# Patient Record
Sex: FEMALE | Race: BLACK OR AFRICAN AMERICAN | Employment: UNEMPLOYED | URBAN - METROPOLITAN AREA
[De-identification: names, ages, dates, MRNs, and addresses within clinical notes are randomized per-mention and may not be internally consistent; named-entity substitution may affect disease eponyms.]

---

## 2023-12-04 LAB
ABO, EXTERNAL RESULT: NORMAL
C. TRACHOMATIS, EXTERNAL RESULT: NEGATIVE
HEP B, EXTERNAL RESULT: NEGATIVE
HIV, EXTERNAL RESULT: NEGATIVE
N. GONORRHOEAE, EXTERNAL RESULT: NEGATIVE
RH FACTOR, EXTERNAL RESULT: POSITIVE
RPR, EXTERNAL RESULT: NONREACTIVE
RUBELLA TITER, EXTERNAL RESULT: NORMAL

## 2024-04-15 LAB — HEPATITIS C ANTIBODY, EXTERNAL RESULT: NEGATIVE

## 2024-06-24 LAB — GBS, EXTERNAL RESULT: POSITIVE

## 2024-07-08 ENCOUNTER — ANESTHESIA (OUTPATIENT)
Facility: HOSPITAL | Age: 24
DRG: 560 | End: 2024-07-08
Payer: MEDICAID

## 2024-07-08 ENCOUNTER — ANESTHESIA EVENT (OUTPATIENT)
Facility: HOSPITAL | Age: 24
DRG: 560 | End: 2024-07-08
Payer: MEDICAID

## 2024-07-08 ENCOUNTER — HOSPITAL ENCOUNTER (INPATIENT)
Facility: HOSPITAL | Age: 24
LOS: 2 days | Discharge: HOME | DRG: 560 | End: 2024-07-10
Attending: STUDENT IN AN ORGANIZED HEALTH CARE EDUCATION/TRAINING PROGRAM | Admitting: STUDENT IN AN ORGANIZED HEALTH CARE EDUCATION/TRAINING PROGRAM
Payer: MEDICAID

## 2024-07-08 PROBLEM — Z3A.38 38 WEEKS GESTATION OF PREGNANCY: Status: ACTIVE | Noted: 2024-07-08

## 2024-07-08 LAB
ABO + RH BLD: NORMAL
ALBUMIN SERPL-MCNC: 2.6 G/DL (ref 3.5–5)
ALBUMIN/GLOB SERPL: 0.7 (ref 1.1–2.2)
ALP SERPL-CCNC: 105 U/L (ref 45–117)
ALT SERPL-CCNC: 14 U/L (ref 12–78)
ANION GAP SERPL CALC-SCNC: 8 MMOL/L (ref 5–15)
AST SERPL-CCNC: 14 U/L (ref 15–37)
BASOPHILS # BLD: 0 K/UL (ref 0–0.1)
BASOPHILS NFR BLD: 0 % (ref 0–1)
BILIRUB SERPL-MCNC: 0.2 MG/DL (ref 0.2–1)
BLOOD GROUP ANTIBODIES SERPL: NORMAL
BUN SERPL-MCNC: 7 MG/DL (ref 6–20)
BUN/CREAT SERPL: 10 (ref 12–20)
CALCIUM SERPL-MCNC: 8.1 MG/DL (ref 8.5–10.1)
CHLORIDE SERPL-SCNC: 111 MMOL/L (ref 97–108)
CO2 SERPL-SCNC: 18 MMOL/L (ref 21–32)
CREAT SERPL-MCNC: 0.73 MG/DL (ref 0.55–1.02)
CREAT UR-MCNC: 352 MG/DL
DIFFERENTIAL METHOD BLD: ABNORMAL
EOSINOPHIL # BLD: 0.1 K/UL (ref 0–0.4)
EOSINOPHIL NFR BLD: 2 % (ref 0–7)
ERYTHROCYTE [DISTWIDTH] IN BLOOD BY AUTOMATED COUNT: 14.9 % (ref 11.5–14.5)
GLOBULIN SER CALC-MCNC: 3.6 G/DL (ref 2–4)
GLUCOSE SERPL-MCNC: 106 MG/DL (ref 65–100)
HCT VFR BLD AUTO: 32.7 % (ref 35–47)
HGB BLD-MCNC: 11 G/DL (ref 11.5–16)
IMM GRANULOCYTES # BLD AUTO: 0 K/UL (ref 0–0.04)
IMM GRANULOCYTES NFR BLD AUTO: 0 % (ref 0–0.5)
LYMPHOCYTES # BLD: 1.8 K/UL (ref 0.8–3.5)
LYMPHOCYTES NFR BLD: 26 % (ref 12–49)
MCH RBC QN AUTO: 28.5 PG (ref 26–34)
MCHC RBC AUTO-ENTMCNC: 33.6 G/DL (ref 30–36.5)
MCV RBC AUTO: 84.7 FL (ref 80–99)
MONOCYTES # BLD: 0.5 K/UL (ref 0–1)
MONOCYTES NFR BLD: 8 % (ref 5–13)
NEUTS SEG # BLD: 4.5 K/UL (ref 1.8–8)
NEUTS SEG NFR BLD: 64 % (ref 32–75)
NRBC # BLD: 0 K/UL (ref 0–0.01)
NRBC BLD-RTO: 0 PER 100 WBC
PLATELET # BLD AUTO: 212 K/UL (ref 150–400)
PMV BLD AUTO: 11.9 FL (ref 8.9–12.9)
POTASSIUM SERPL-SCNC: 3.9 MMOL/L (ref 3.5–5.1)
PROT SERPL-MCNC: 6.2 G/DL (ref 6.4–8.2)
PROT UR-MCNC: 41 MG/DL (ref 0–11.9)
PROT/CREAT UR-RTO: 0.1
RBC # BLD AUTO: 3.86 M/UL (ref 3.8–5.2)
SODIUM SERPL-SCNC: 137 MMOL/L (ref 136–145)
SPECIMEN EXP DATE BLD: NORMAL
WBC # BLD AUTO: 7 K/UL (ref 3.6–11)

## 2024-07-08 PROCEDURE — 2500000003 HC RX 250 WO HCPCS: Performed by: NURSE ANESTHETIST, CERTIFIED REGISTERED

## 2024-07-08 PROCEDURE — G0378 HOSPITAL OBSERVATION PER HR: HCPCS

## 2024-07-08 PROCEDURE — 51701 INSERT BLADDER CATHETER: CPT

## 2024-07-08 PROCEDURE — 86780 TREPONEMA PALLIDUM: CPT

## 2024-07-08 PROCEDURE — 7210000100 HC LABOR FEE PER 1 HR

## 2024-07-08 PROCEDURE — 0UQMXZZ REPAIR VULVA, EXTERNAL APPROACH: ICD-10-PCS | Performed by: STUDENT IN AN ORGANIZED HEALTH CARE EDUCATION/TRAINING PROGRAM

## 2024-07-08 PROCEDURE — 86901 BLOOD TYPING SEROLOGIC RH(D): CPT

## 2024-07-08 PROCEDURE — G0379 DIRECT REFER HOSPITAL OBSERV: HCPCS

## 2024-07-08 PROCEDURE — 00HU33Z INSERTION OF INFUSION DEVICE INTO SPINAL CANAL, PERCUTANEOUS APPROACH: ICD-10-PCS | Performed by: ANESTHESIOLOGY

## 2024-07-08 PROCEDURE — 3700000156 HC EPIDURAL ANESTHESIA: Performed by: NURSE ANESTHETIST, CERTIFIED REGISTERED

## 2024-07-08 PROCEDURE — 2580000003 HC RX 258: Performed by: STUDENT IN AN ORGANIZED HEALTH CARE EDUCATION/TRAINING PROGRAM

## 2024-07-08 PROCEDURE — 0HQ9XZZ REPAIR PERINEUM SKIN, EXTERNAL APPROACH: ICD-10-PCS | Performed by: STUDENT IN AN ORGANIZED HEALTH CARE EDUCATION/TRAINING PROGRAM

## 2024-07-08 PROCEDURE — 7220000101 HC DELIVERY VAGINAL/SINGLE

## 2024-07-08 PROCEDURE — 2500000003 HC RX 250 WO HCPCS: Performed by: STUDENT IN AN ORGANIZED HEALTH CARE EDUCATION/TRAINING PROGRAM

## 2024-07-08 PROCEDURE — 86850 RBC ANTIBODY SCREEN: CPT

## 2024-07-08 PROCEDURE — 85025 COMPLETE CBC W/AUTO DIFF WBC: CPT

## 2024-07-08 PROCEDURE — 80053 COMPREHEN METABOLIC PANEL: CPT

## 2024-07-08 PROCEDURE — 86900 BLOOD TYPING SEROLOGIC ABO: CPT

## 2024-07-08 PROCEDURE — 82570 ASSAY OF URINE CREATININE: CPT

## 2024-07-08 PROCEDURE — 3700000025 EPIDURAL BLOCK: Performed by: ANESTHESIOLOGY

## 2024-07-08 PROCEDURE — 1120000000 HC RM PRIVATE OB

## 2024-07-08 PROCEDURE — 6360000002 HC RX W HCPCS: Performed by: NURSE ANESTHETIST, CERTIFIED REGISTERED

## 2024-07-08 PROCEDURE — 6370000000 HC RX 637 (ALT 250 FOR IP): Performed by: STUDENT IN AN ORGANIZED HEALTH CARE EDUCATION/TRAINING PROGRAM

## 2024-07-08 PROCEDURE — 84156 ASSAY OF PROTEIN URINE: CPT

## 2024-07-08 PROCEDURE — 36415 COLL VENOUS BLD VENIPUNCTURE: CPT

## 2024-07-08 PROCEDURE — 6360000002 HC RX W HCPCS: Performed by: STUDENT IN AN ORGANIZED HEALTH CARE EDUCATION/TRAINING PROGRAM

## 2024-07-08 RX ORDER — MAGNESIUM CARB/ALUMINUM HYDROX 105-160MG
30 TABLET,CHEWABLE ORAL DAILY PRN
Status: DISCONTINUED | OUTPATIENT
Start: 2024-07-08 | End: 2024-07-10 | Stop reason: HOSPADM

## 2024-07-08 RX ORDER — EPHEDRINE SULFATE/0.9% NACL/PF 25 MG/5 ML
10 SYRINGE (ML) INTRAVENOUS ONCE
Status: DISCONTINUED | OUTPATIENT
Start: 2024-07-08 | End: 2024-07-10 | Stop reason: HOSPADM

## 2024-07-08 RX ORDER — SODIUM CHLORIDE 0.9 % (FLUSH) 0.9 %
5-40 SYRINGE (ML) INJECTION EVERY 12 HOURS SCHEDULED
Status: DISCONTINUED | OUTPATIENT
Start: 2024-07-08 | End: 2024-07-10 | Stop reason: HOSPADM

## 2024-07-08 RX ORDER — PROCHLORPERAZINE EDISYLATE 5 MG/ML
10 INJECTION INTRAMUSCULAR; INTRAVENOUS EVERY 6 HOURS PRN
Status: DISCONTINUED | OUTPATIENT
Start: 2024-07-08 | End: 2024-07-10 | Stop reason: HOSPADM

## 2024-07-08 RX ORDER — SODIUM CHLORIDE, SODIUM LACTATE, POTASSIUM CHLORIDE, AND CALCIUM CHLORIDE .6; .31; .03; .02 G/100ML; G/100ML; G/100ML; G/100ML
1000 INJECTION, SOLUTION INTRAVENOUS PRN
Status: DISCONTINUED | OUTPATIENT
Start: 2024-07-08 | End: 2024-07-10 | Stop reason: HOSPADM

## 2024-07-08 RX ORDER — SODIUM CHLORIDE 0.9 % (FLUSH) 0.9 %
5-40 SYRINGE (ML) INJECTION PRN
Status: DISCONTINUED | OUTPATIENT
Start: 2024-07-08 | End: 2024-07-10 | Stop reason: HOSPADM

## 2024-07-08 RX ORDER — FENTANYL/BUPIVACAINE/NS/PF 2-1250MCG
10 PLASTIC BAG, INJECTION (ML) INJECTION CONTINUOUS
Status: DISCONTINUED | OUTPATIENT
Start: 2024-07-08 | End: 2024-07-10 | Stop reason: HOSPADM

## 2024-07-08 RX ORDER — NALOXONE HYDROCHLORIDE 0.4 MG/ML
INJECTION, SOLUTION INTRAMUSCULAR; INTRAVENOUS; SUBCUTANEOUS PRN
Status: DISCONTINUED | OUTPATIENT
Start: 2024-07-08 | End: 2024-07-10 | Stop reason: HOSPADM

## 2024-07-08 RX ORDER — ONDANSETRON 2 MG/ML
4 INJECTION INTRAMUSCULAR; INTRAVENOUS EVERY 6 HOURS PRN
Status: DISCONTINUED | OUTPATIENT
Start: 2024-07-08 | End: 2024-07-10 | Stop reason: HOSPADM

## 2024-07-08 RX ORDER — ONDANSETRON 4 MG/1
4 TABLET, ORALLY DISINTEGRATING ORAL EVERY 6 HOURS PRN
Status: DISCONTINUED | OUTPATIENT
Start: 2024-07-08 | End: 2024-07-10 | Stop reason: HOSPADM

## 2024-07-08 RX ORDER — LIDOCAINE HYDROCHLORIDE 20 MG/ML
INJECTION, SOLUTION EPIDURAL; INFILTRATION; INTRACAUDAL; PERINEURAL PRN
Status: DISCONTINUED | OUTPATIENT
Start: 2024-07-08 | End: 2024-07-08 | Stop reason: SDUPTHER

## 2024-07-08 RX ORDER — SODIUM CHLORIDE, SODIUM LACTATE, POTASSIUM CHLORIDE, AND CALCIUM CHLORIDE .6; .31; .03; .02 G/100ML; G/100ML; G/100ML; G/100ML
500 INJECTION, SOLUTION INTRAVENOUS PRN
Status: DISCONTINUED | OUTPATIENT
Start: 2024-07-08 | End: 2024-07-10 | Stop reason: HOSPADM

## 2024-07-08 RX ORDER — DOCUSATE SODIUM 100 MG/1
100 CAPSULE, LIQUID FILLED ORAL 2 TIMES DAILY
Status: DISCONTINUED | OUTPATIENT
Start: 2024-07-08 | End: 2024-07-09 | Stop reason: SDUPTHER

## 2024-07-08 RX ORDER — CARBOPROST TROMETHAMINE 250 UG/ML
250 INJECTION, SOLUTION INTRAMUSCULAR PRN
Status: DISCONTINUED | OUTPATIENT
Start: 2024-07-08 | End: 2024-07-10 | Stop reason: HOSPADM

## 2024-07-08 RX ORDER — SODIUM CHLORIDE, SODIUM LACTATE, POTASSIUM CHLORIDE, CALCIUM CHLORIDE 600; 310; 30; 20 MG/100ML; MG/100ML; MG/100ML; MG/100ML
INJECTION, SOLUTION INTRAVENOUS CONTINUOUS
Status: DISCONTINUED | OUTPATIENT
Start: 2024-07-08 | End: 2024-07-10 | Stop reason: HOSPADM

## 2024-07-08 RX ORDER — LIDOCAINE HYDROCHLORIDE 10 MG/ML
30 INJECTION, SOLUTION EPIDURAL; INFILTRATION; INTRACAUDAL; PERINEURAL PRN
Status: DISCONTINUED | OUTPATIENT
Start: 2024-07-08 | End: 2024-07-10 | Stop reason: HOSPADM

## 2024-07-08 RX ORDER — SODIUM CHLORIDE 9 MG/ML
25 INJECTION, SOLUTION INTRAVENOUS PRN
Status: DISCONTINUED | OUTPATIENT
Start: 2024-07-08 | End: 2024-07-10 | Stop reason: HOSPADM

## 2024-07-08 RX ORDER — FENTANYL CITRATE 50 UG/ML
25 INJECTION, SOLUTION INTRAMUSCULAR; INTRAVENOUS
Status: DISCONTINUED | OUTPATIENT
Start: 2024-07-08 | End: 2024-07-10 | Stop reason: HOSPADM

## 2024-07-08 RX ORDER — PNV NO.95/FERROUS FUM/FOLIC AC 28MG-0.8MG
TABLET ORAL
COMMUNITY

## 2024-07-08 RX ORDER — TERBUTALINE SULFATE 1 MG/ML
0.25 INJECTION, SOLUTION SUBCUTANEOUS
Status: ACTIVE | OUTPATIENT
Start: 2024-07-08 | End: 2024-07-09

## 2024-07-08 RX ORDER — METHYLERGONOVINE MALEATE 0.2 MG/ML
200 INJECTION INTRAVENOUS PRN
Status: DISCONTINUED | OUTPATIENT
Start: 2024-07-08 | End: 2024-07-10 | Stop reason: HOSPADM

## 2024-07-08 RX ORDER — MISOPROSTOL 200 UG/1
400 TABLET ORAL PRN
Status: DISCONTINUED | OUTPATIENT
Start: 2024-07-08 | End: 2024-07-10 | Stop reason: HOSPADM

## 2024-07-08 RX ORDER — TRANEXAMIC ACID 10 MG/ML
1000 INJECTION, SOLUTION INTRAVENOUS
Status: COMPLETED | OUTPATIENT
Start: 2024-07-08 | End: 2024-07-08

## 2024-07-08 RX ORDER — BUPIVACAINE HYDROCHLORIDE 2.5 MG/ML
INJECTION, SOLUTION EPIDURAL; INFILTRATION; INTRACAUDAL PRN
Status: DISCONTINUED | OUTPATIENT
Start: 2024-07-08 | End: 2024-07-08 | Stop reason: SDUPTHER

## 2024-07-08 RX ADMIN — LIDOCAINE HYDROCHLORIDE 3 ML: 10; .005 INJECTION, SOLUTION EPIDURAL; INFILTRATION; INTRACAUDAL; PERINEURAL at 17:49

## 2024-07-08 RX ADMIN — SODIUM CHLORIDE, POTASSIUM CHLORIDE, SODIUM LACTATE AND CALCIUM CHLORIDE: 600; 310; 30; 20 INJECTION, SOLUTION INTRAVENOUS at 18:14

## 2024-07-08 RX ADMIN — OXYTOCIN 2 MILLI-UNITS/MIN: 10 INJECTION, SOLUTION INTRAMUSCULAR; INTRAVENOUS at 18:16

## 2024-07-08 RX ADMIN — BUPIVACAINE HYDROCHLORIDE 6 MG: 2.5 INJECTION, SOLUTION EPIDURAL; INFILTRATION; INTRACAUDAL; PERINEURAL at 17:55

## 2024-07-08 RX ADMIN — METHYLERGONOVINE MALEATE 200 MCG: 0.2 INJECTION, SOLUTION INTRAMUSCULAR; INTRAVENOUS at 23:56

## 2024-07-08 RX ADMIN — HYDROMORPHONE HYDROCHLORIDE 0.5 MG: 1 INJECTION, SOLUTION INTRAMUSCULAR; INTRAVENOUS; SUBCUTANEOUS at 15:30

## 2024-07-08 RX ADMIN — Medication 10 ML/HR: at 18:04

## 2024-07-08 RX ADMIN — LIDOCAINE HYDROCHLORIDE 3 ML: 20 INJECTION, SOLUTION EPIDURAL; INFILTRATION; INTRACAUDAL at 17:39

## 2024-07-08 RX ADMIN — ONDANSETRON 4 MG: 2 INJECTION INTRAMUSCULAR; INTRAVENOUS at 15:27

## 2024-07-08 RX ADMIN — TRANEXAMIC ACID 1000 MG: 10 INJECTION, SOLUTION INTRAVENOUS at 22:47

## 2024-07-08 RX ADMIN — SODIUM CHLORIDE, POTASSIUM CHLORIDE, SODIUM LACTATE AND CALCIUM CHLORIDE 1000 ML: 600; 310; 30; 20 INJECTION, SOLUTION INTRAVENOUS at 15:28

## 2024-07-08 RX ADMIN — SODIUM CHLORIDE 5 MILLION UNITS: 900 INJECTION INTRAVENOUS at 12:54

## 2024-07-08 RX ADMIN — SODIUM CHLORIDE 2.5 MILLION UNITS: 9 INJECTION, SOLUTION INTRAVENOUS at 16:32

## 2024-07-08 RX ADMIN — OXYTOCIN 87.3 MILLI-UNITS/MIN: 10 INJECTION, SOLUTION INTRAMUSCULAR; INTRAVENOUS at 23:55

## 2024-07-08 RX ADMIN — Medication 25 MCG: at 13:21

## 2024-07-08 ASSESSMENT — PAIN SCALES - GENERAL: PAINLEVEL_OUTOF10: 5

## 2024-07-08 ASSESSMENT — PAIN DESCRIPTION - ORIENTATION: ORIENTATION: ANTERIOR

## 2024-07-08 ASSESSMENT — PAIN DESCRIPTION - DESCRIPTORS: DESCRIPTORS: CRAMPING

## 2024-07-08 ASSESSMENT — PAIN - FUNCTIONAL ASSESSMENT: PAIN_FUNCTIONAL_ASSESSMENT: PREVENTS OR INTERFERES SOME ACTIVE ACTIVITIES AND ADLS

## 2024-07-08 ASSESSMENT — PAIN DESCRIPTION - LOCATION: LOCATION: ABDOMEN

## 2024-07-08 NOTE — PROGRESS NOTES
1135 Pt arrives to unit for labor. Pt's water broke around 0900. Pt stated fluid was moderate and clear, no fluid noted. Pt denies ctx, bleeding, or pain. This RN explained importance of ambulation and position changes. Pt's questions answered.    1214 VORB for misoprostol and pcn by MD Yin.    1504 This RN requested pain medication orders.    1550 Pt requests epidural. PENNIE Dawkins MD notified.    1734 Chitra CRNA at bedside for epidural placement    1738 Epidural Time Out     1748 Epidural Test dose    1754 Pt assisted back to supine position. Epidural bolus given.

## 2024-07-08 NOTE — PROGRESS NOTES
1900- Report received from EARL Erickson RN   2040-Patient actively pushing.  RN remains in continuous attendance at the bedside.  Assessment & evaluation of fetal heart rate ongoing via continuous EFM.   2100- Pt laboring down   2137- Patient actively pushing.  RN remains in continuous attendance at the bedside.  Assessment & evaluation of fetal heart rate ongoing via continuous EFM.   2159- RN remained at bedside throughout pushing.  EFM continuously assessed.  Vaginal delivery of viable infant.   0000- CNM at bedside to evaluate pt bleeding

## 2024-07-08 NOTE — H&P
History & Physical    Name: Cintia Jenkins MRN: [id number]  SSN: [id number]    YOB: 2000  Age: 24 y.o.  Sex: female        Subjective:     Estimated Date of Delivery: 24    Ms. Jenkins is a  at 38w3d presenting to L&D for PROM. Denies ctx, VB. +GFM. Presented to clinic this morning with complaints of LOF starting around 9am. She has had continuous leaking since that time. Ruled in for rupture x 3 in clinic. Has not had any contractions since that time. She was 2cm at office visit on     Pregnancy complicated by:  GBS positive  H/o depression, previously on Zoloft  BMI 39: EFW at 34wks, 46%tile    OB History          2    Para   0    Term                AB        Living             SAB        IAB        Ectopic        Molar        Multiple        Live Births                  History reviewed. No pertinent past medical history.  No past surgical history on file.  Social History     Occupational History    Not on file   Tobacco Use    Smoking status: Never    Smokeless tobacco: Never   Substance and Sexual Activity    Alcohol use: Not Currently    Drug use: Never    Sexual activity: Not on file     No family history on file.    No Known Allergies  Prior to Admission medications    Medication Sig Start Date End Date Taking? Authorizing Provider   Prenatal Vit-Fe Fumarate-FA (PRENATAL VITAMIN) 27-0.8 MG TABS Take by mouth   Yes Provider, Joyce, MD        Review of Systems: Pertinent items are noted in HPI.    Objective:     Vitals:  Vitals:    24 1158 24 1221 24 1222   BP: 133/62     Pulse: 83  83   Resp: 16     Temp: 98.1 °F (36.7 °C)     TempSrc: Oral     SpO2: 100%     Weight:  (!) 139.7 kg (308 lb)    Height:  1.753 m (5' 9\")         Physical Exam:  Gen: NAD      Fetal Heart Rate: Reactive  Baseline: 115 per minute  Variability: moderate  Accelerations: yes  Decelerations: none  Uterine contractions: irregular, every 6-10 minutes    Prenatal Labs:

## 2024-07-08 NOTE — ANESTHESIA PROCEDURE NOTES
Epidural Block    Patient location during procedure: OB  Start time: 7/8/2024 5:34 PM  End time: 7/8/2024 5:56 PM  Reason for block: labor epidural  Staffing  Performed: resident/CRNA   Anesthesiologist: Enzo Dawkins MD  Resident/CRNA: Khloe Friedman APRN - CRNA  Performed by: Khloe Friedman APRN - CRNA  Authorized by: Enzo Dawkins MD    Epidural  Patient position: sitting  Prep: ChloraPrep  Patient monitoring: continuous pulse ox and frequent blood pressure checks  Approach: midline  Location: L4-5  Injection technique: CAROLYN saline  Provider prep: mask  Needle  Needle type: Tuohy   Needle gauge: 17 G  Needle length: 3.5 in  Needle insertion depth: 9 cm  Catheter type: end hole  Catheter size: 19 G  Catheter at skin depth: 16 cm  Test dose: negativeCatheter Secured: tegaderm and tape  Assessment  Hemodynamics: stable  Attempts: 1  Outcomes: patient tolerated procedure well  Additional Notes  Pt c/o Pain 8/10 with contractions, epidural placed as noted without difficulty.  Preanesthetic Checklist  Completed: patient identified, IV checked, site marked, risks and benefits discussed, surgical/procedural consents, equipment checked, pre-op evaluation, timeout performed, anesthesia consent given, oxygen available, monitors applied/VS acknowledged, fire risk safety assessment completed and verbalized and blood product R/B/A discussed and consented

## 2024-07-08 NOTE — ANESTHESIA PRE PROCEDURE
Department of Anesthesiology  Preprocedure Note       Name:  Cintia Jenkins   Age:  24 y.o.  :  2000                                          MRN:  [id number]         Date:  2024      Surgeon: * No surgeons listed *    Procedure: * No procedures listed *    Medications prior to admission:   Prior to Admission medications    Medication Sig Start Date End Date Taking? Authorizing Provider   Prenatal Vit-Fe Fumarate-FA (PRENATAL VITAMIN) 27-0.8 MG TABS Take by mouth   Yes Provider, MD Joyce       Current medications:    Current Facility-Administered Medications   Medication Dose Route Frequency Provider Last Rate Last Admin    penicillin G potassium 2.5 million units in 0.9% sodium chloride 100 mL IVPB  2.5 Million Units IntraVENous Q4H Elida Esqueda  mL/hr at 24 1632 2.5 Million Units at 24 1632    lactated ringers IV soln infusion   IntraVENous Continuous Elida Esqueda MD        lactated ringers IV soln infusion   IntraVENous Continuous Elida Esqueda MD        lactated ringers bolus 500 mL  500 mL IntraVENous PRN Elida Esqueda MD        Or    lactated ringers bolus 1,000 mL  1,000 mL IntraVENous PRN Elida Esqueda .9 mL/hr at 24 1528 1,000 mL at 24 1528    sodium chloride flush 0.9 % injection 5-40 mL  5-40 mL IntraVENous 2 times per day Elida Esqueda MD        sodium chloride flush 0.9 % injection 5-40 mL  5-40 mL IntraVENous PRN Elida Esqueda MD        0.9 % sodium chloride infusion  25 mL IntraVENous PRN Elida Esqueda MD        miSOPROStol (CYTOTEC) pre-split tablet TABS 25 mcg  25 mcg Vaginal Q2H Elida Esqueda MD        methylergonovine (METHERGINE) injection 200 mcg  200 mcg IntraMUSCular PRN Elida Esqueda MD        carboprost (HEMABATE) injection 250 mcg  250 mcg IntraMUSCular PRElida Kasper MD        miSOPROStol (CYTOTEC) tablet 400 mcg  400 mcg Buccal PRN Elida Esqueda MD        tranexamic acid-NaCl

## 2024-07-09 LAB — T PALLIDUM AB SER QL IA: NON REACTIVE

## 2024-07-09 PROCEDURE — 1120000000 HC RM PRIVATE OB

## 2024-07-09 PROCEDURE — 94761 N-INVAS EAR/PLS OXIMETRY MLT: CPT

## 2024-07-09 PROCEDURE — 2580000003 HC RX 258: Performed by: STUDENT IN AN ORGANIZED HEALTH CARE EDUCATION/TRAINING PROGRAM

## 2024-07-09 PROCEDURE — 2700000000 HC OXYGEN THERAPY PER DAY

## 2024-07-09 PROCEDURE — 6360000002 HC RX W HCPCS: Performed by: STUDENT IN AN ORGANIZED HEALTH CARE EDUCATION/TRAINING PROGRAM

## 2024-07-09 PROCEDURE — 6370000000 HC RX 637 (ALT 250 FOR IP)

## 2024-07-09 PROCEDURE — 51701 INSERT BLADDER CATHETER: CPT

## 2024-07-09 RX ORDER — ACETAMINOPHEN 500 MG
1000 TABLET ORAL EVERY 8 HOURS SCHEDULED
Status: DISCONTINUED | OUTPATIENT
Start: 2024-07-09 | End: 2024-07-10 | Stop reason: HOSPADM

## 2024-07-09 RX ORDER — SODIUM CHLORIDE 0.9 % (FLUSH) 0.9 %
5-40 SYRINGE (ML) INJECTION PRN
Status: DISCONTINUED | OUTPATIENT
Start: 2024-07-09 | End: 2024-07-10 | Stop reason: HOSPADM

## 2024-07-09 RX ORDER — SODIUM CHLORIDE 0.9 % (FLUSH) 0.9 %
5-40 SYRINGE (ML) INJECTION EVERY 12 HOURS SCHEDULED
Status: DISCONTINUED | OUTPATIENT
Start: 2024-07-09 | End: 2024-07-10 | Stop reason: HOSPADM

## 2024-07-09 RX ORDER — ONDANSETRON 4 MG/1
4 TABLET, ORALLY DISINTEGRATING ORAL EVERY 6 HOURS PRN
Status: DISCONTINUED | OUTPATIENT
Start: 2024-07-09 | End: 2024-07-10 | Stop reason: HOSPADM

## 2024-07-09 RX ORDER — ONDANSETRON 2 MG/ML
4 INJECTION INTRAMUSCULAR; INTRAVENOUS EVERY 6 HOURS PRN
Status: DISCONTINUED | OUTPATIENT
Start: 2024-07-09 | End: 2024-07-10 | Stop reason: HOSPADM

## 2024-07-09 RX ORDER — DOCUSATE SODIUM 100 MG/1
100 CAPSULE, LIQUID FILLED ORAL 2 TIMES DAILY
Status: DISCONTINUED | OUTPATIENT
Start: 2024-07-09 | End: 2024-07-10 | Stop reason: HOSPADM

## 2024-07-09 RX ORDER — LANOLIN/MINERAL OIL
LOTION (ML) TOPICAL PRN
Status: DISCONTINUED | OUTPATIENT
Start: 2024-07-09 | End: 2024-07-10 | Stop reason: HOSPADM

## 2024-07-09 RX ORDER — SODIUM CHLORIDE 9 MG/ML
INJECTION, SOLUTION INTRAVENOUS PRN
Status: DISCONTINUED | OUTPATIENT
Start: 2024-07-09 | End: 2024-07-10 | Stop reason: HOSPADM

## 2024-07-09 RX ORDER — IBUPROFEN 800 MG/1
800 TABLET ORAL EVERY 8 HOURS SCHEDULED
Status: DISCONTINUED | OUTPATIENT
Start: 2024-07-09 | End: 2024-07-10 | Stop reason: HOSPADM

## 2024-07-09 RX ADMIN — OXYTOCIN 87.3 MILLI-UNITS/MIN: 10 INJECTION, SOLUTION INTRAMUSCULAR; INTRAVENOUS at 01:26

## 2024-07-09 RX ADMIN — IBUPROFEN 800 MG: 800 TABLET, FILM COATED ORAL at 14:56

## 2024-07-09 RX ADMIN — OXYTOCIN 87.3 MILLI-UNITS/MIN: 10 INJECTION, SOLUTION INTRAMUSCULAR; INTRAVENOUS at 01:17

## 2024-07-09 RX ADMIN — ACETAMINOPHEN 1000 MG: 500 TABLET ORAL at 14:57

## 2024-07-09 RX ADMIN — OXYTOCIN 87.3 MILLI-UNITS/MIN: 10 INJECTION, SOLUTION INTRAMUSCULAR; INTRAVENOUS at 01:34

## 2024-07-09 ASSESSMENT — PAIN DESCRIPTION - ORIENTATION: ORIENTATION: LOWER

## 2024-07-09 ASSESSMENT — PAIN SCALES - GENERAL: PAINLEVEL_OUTOF10: 6

## 2024-07-09 ASSESSMENT — PAIN DESCRIPTION - DESCRIPTORS: DESCRIPTORS: ACHING;CRAMPING

## 2024-07-09 ASSESSMENT — PAIN DESCRIPTION - LOCATION: LOCATION: ABDOMEN

## 2024-07-09 NOTE — LACTATION NOTE
This note was copied from a baby's chart.  Mother states her plan is to combination formula feed and possibly pump for baby.  She does not wish for assistance breastfeeding.  Breastfeeding basics reviewed and importance of frequent early stimulation and its impact on lactogenesis shared with mother.  Mother receptive to hand pump and Medela Ponemah manual pump brought to bedside with instruction.  Paced feeds reviewed and slower flow nipples to bedside.     Discussed with mother her plan for feeding.  Reviewed the benefits of exclusive breast milk feeding during the hospital stay.   Informed her of the risks of using formula to supplement in the first few days of life as well as the benefits of successful breast milk feeding; referred her to the Breastfeeding booklet about this information.   She acknowledges understanding of information reviewed and states that it is her plan to formula feed and possible pump for her infant.  Will support her choice and offer additional information as needed.     Hand Expression Education:  Mom taught how to manually hand express her colostrum.  Emphasized the importance of providing infant with valuable colostrum as infant rests skin to skin at breast.  Aware to avoid extended periods of non-feeding.  Aware to offer 10-20+ drops of colostrum every 2-3 hours until infant is latching and nursing effectively.  Taught the rationale behind this low tech but highly effective evidence based practice.

## 2024-07-09 NOTE — PROGRESS NOTES
PostPartum Note    Cintia Jenkins  [id number]  2000  24 y.o.    S:  Ms. Cintia Jenkins is a 24 y.o.  PPD #1 s/p  @ 38w3d.  Doing well.  She had a baby boy.  Her lochia is like a period.  She describes her pain as mild and is well controlled with PO medications.   She is ambulating and voiding.  Tolerating PO intake.      O:   /87   Pulse 85   Temp 98.4 °F (36.9 °C) (Oral)   Resp 16   Ht 1.753 m (5' 9\")   Wt (!) 139.7 kg (308 lb)   SpO2 100%   Breastfeeding Unknown   BMI 45.48 kg/m²     Lab Results   Component Value Date/Time    WBC 7.0 2024 12:26 PM    HGB 11.0 2024 12:26 PM    HCT 32.7 2024 12:26 PM     2024 12:26 PM    MCV 84.7 2024 12:26 PM       Gen - No acute distress  Abdomen - Fundus firm, below the umbilicus   Ext - Warm, well perfused.  Nontender    A/P:  PPD #1 s/p  @ 38w3d doing well.    1.  Routine PP instructions/ care discussed  2.  Circumcision desired - to be performed this AM   3.  Discharge PP2   4.  F/U 4-6 weeks for PP check.      Justine Khoury MD  [facility]

## 2024-07-09 NOTE — DISCHARGE SUMMARY
Obstetrical Discharge Summary     Name: Cintia Jenkins MRN: [id number]  SSN: [id number]    YOB: 2000  Age: 24 y.o.  Sex: female      Admit Date: 2024    Discharge Date: 7/10/2024     Attending Physician:  Camila Wallace DO     Delivering Physician:  Bry Johnson APRN - CNM     * Admission Diagnoses:   IUP @ 38w3d         * Discharge Diagnoses:   Delivery of a VMI via  by Bry Johnson APRN - CNM  on 2024.  Apgars were 9 and 9.            Additional Diagnoses:  No components found for: \"OBEXTABORH\", \"OBEXTABSCRN\", \"OBEXTRUBELLA\", \"OBEXTGRBS\" There is no immunization history for the selected administration types on file for this patient.    * Procedures:            * Discharge Condition: good    * Hospital Course: Normal hospital course following the delivery.    * Disposition: Home    Discharge Medications:      Medication List        ASK your doctor about these medications      Prenatal Vitamin 27-0.8 MG Tabs              * Follow-up Care/Patient Instructions:  Activity: Activity as tolerated  Diet: Regular Diet  Wound Care: As directed      Followup 10-14 days and then 6 weeks for PP check        Signed By:  Justine Khoury MD     2024

## 2024-07-09 NOTE — L&D DELIVERY NOTE
Cintia, 23yo,  admitted for SROM @ 38w3d. She labored and progressed to complete. Cintia pushed with contractions over 22 minutes.  of live male  in direct OP position. Vigorous  male delivered through tight nuchal cord x 1 and around body. Cord reduced and  placed into father's hands and lifted by FOB to mother's abdomen for drying and stimulation. APGAR'S 9/9. When pulseless, cord was double clamped by CNM and cut by FOB. Cord blood collected. Placenta delivered spontaneously, intact, three vessel cord. Fundus firm. QBL 200mL. Oxytocin infusing. Vulva, vagina and perineum inspected. Bilateral labial abrasions present and hemostatic. First degree perineal laceration x 2 identified and repaired using 3-0 vicryl. Mother and  baby boy \"Ok\" stable and bonding skin to skin. Mother and FOB happy with birth experience.     Taqueria Jenkins [[id number]]      Labor Events     Labor: No   Steroids: None  Cervical Ripening Date/Time:      Antibiotics Received during Labor: Yes  Rupture Date/Time:  24 09:00:00   Rupture Type: SROM  Fluid Color: Clear  Fluid Odor: None  Induction: None  Augmentation: Oxytocin  Labor Complications: Cord around body              Anesthesia    Method: Epidural       Labor Length    3rd stage: 0h 06m       Delivery Details      Delivery Date: 24 Delivery Time: 21:59:00   Delivery Type: Vaginal, Spontaneous              [place] Presentation    Presentation: Vertex  Position: Middle  _: Occiput  _: Posterior       Shoulder Dystocia    Shoulder Dystocia Present?: No       Assisted Delivery Details    Forceps Attempted?: No  Vacuum Extractor Attempted?: No                           Cord    Vessels: 3 Vessels  Complications: Nuchal Tight  Cord Around: Head, Trunk  Delayed Cord Clamping?: Yes  Cord Clamped Date/Time: 2024 22:02:35  Cord Blood Disposition: Lab  Gases Sent?: No              Placenta    Date/Time: 2024 22:05:23  Removal:

## 2024-07-09 NOTE — DISCHARGE INSTRUCTIONS
Discharge Instructions for Vaginal Delivery    Patient ID:  Cintia Jenkins  [id number]  24 y.o.  2000    Take Home Medications       Continue taking your prenatal vitamins if you are breastfeeding.    Follow-up care is a key part of your treatment and safety. Please schedule and keep appointments.  Follow-up with your primary OB in 6 weeks.    Activity  Avoid anything in your vagina for 6 weeks (no intercourse, tampons, or douching).  You may drive unless you are taking prescription pain medications.  Climbing stairs and light lifting are okay.  Please avoid excessive exercise, though walking is okay- you'll be tired!    Diet  Regular diet as tolerated.  Be sure to drink plenty of fluids if you are breastfeeding.    Wound care  If you have stitches, continue to rinse with a squirt bottle of warm water each time you void for about 7-10 days..  Your stitches will gradually dissolve over four to eight weeks.  Sitz baths are also helpful to keep the wound clean, encourage healing, and to help with pain associated with the stitches or hemorrhoids.  You can use either a sitz bath basin or a bathtub filled with 2-3\" inches of plain warm water.  Soak for 10 minutes 3 times a day as tolerated.    Pain Management  Over the counter medications such as Tylenol and ibuprofen (Motrin or Advil) are ideal.  These may be taken together, alternating doses.  You may  take the maximum dose:  Motrin or Advil (generic ibuprofen), either 3 tablets every 6 hours or 4 tablets every 8 hours or Tylenol (acetominophen) 1000mg every 6 hours (equivalent to 2 extra strength Tylenol).  You may also have a precrescription for stronger pain medication.  Take only as needed and transition to over the counter medication in the next few days. Minimize amounts of the prescription medication, as it can be habit-forming and will worsen or cause constipation. Most patients will find that within a couple of days, their pain is adequately controlled

## 2024-07-09 NOTE — ANESTHESIA POSTPROCEDURE EVALUATION
Department of Anesthesiology  Postprocedure Note    Patient: Cintia Jenkins  MRN: [id number]  YOB: 2000  Date of evaluation: 7/9/2024    Procedure Summary       Date: 07/08/24 Room / Location:     Anesthesia Start: 1734 Anesthesia Stop: 2159    Procedure: Labor Analgesia Diagnosis:     Scheduled Providers:  Responsible Provider: Enzo Dawkins MD    Anesthesia Type: epidural ASA Status: 2            Anesthesia Type: No value filed.    Conor Phase I:      Conor Phase II:      Anesthesia Post Evaluation    No notable events documented.

## 2024-07-09 NOTE — CARE COORDINATION
7/9/2024  11:48 AM    CM met with SERINA to complete initial assessment and begin discharge planning.  MOB verified and confirmed demographics.  SERINA lives with family, at the address on file. SERINA reports she has good family support, and feels like she has the support she needs when she returns home.  SERINA plans to breast and bottle feed baby.  Dr. Guevara will provide follow up care for infant. SERINA has car seat, bassinet/crib, clothing, bottles and all necessary supplies for baby. SERINA has Medicaid coverage and will be adding baby to this policy. CM discussed process to add baby to insurance, SERINA verbalized understanding.       07/09/24 7014   Service Assessment   Patient Orientation Alert and Oriented   Cognition Alert   History Provided By Patient   Primary Caregiver Self   Support Systems Family Members   PCP Verified by CM Yes   Last Visit to PCP Within last 3 months   Prior Functional Level Independent in ADLs/IADLs   Current Functional Level Independent in ADLs/IADLs   Can patient return to prior living arrangement Yes   Ability to make needs known: Good   Family able to assist with home care needs: Yes   Would you like for me to discuss the discharge plan with any other family members/significant others, and if so, who? No   Financial Resources Medicaid     Devon Briceno CM

## 2024-07-10 VITALS
HEIGHT: 69 IN | BODY MASS INDEX: 43.4 KG/M2 | SYSTOLIC BLOOD PRESSURE: 101 MMHG | TEMPERATURE: 97.7 F | WEIGHT: 293 LBS | DIASTOLIC BLOOD PRESSURE: 72 MMHG | HEART RATE: 88 BPM | OXYGEN SATURATION: 100 % | RESPIRATION RATE: 20 BRPM

## 2024-07-10 PROBLEM — Z3A.38 38 WEEKS GESTATION OF PREGNANCY: Status: RESOLVED | Noted: 2024-07-08 | Resolved: 2024-07-10

## 2024-07-10 PROCEDURE — 6370000000 HC RX 637 (ALT 250 FOR IP)

## 2024-07-10 PROCEDURE — 94761 N-INVAS EAR/PLS OXIMETRY MLT: CPT

## 2024-07-10 RX ADMIN — IBUPROFEN 800 MG: 800 TABLET, FILM COATED ORAL at 06:22

## 2024-07-10 RX ADMIN — ACETAMINOPHEN 1000 MG: 500 TABLET ORAL at 07:36

## 2024-07-10 RX ADMIN — DOCUSATE SODIUM 100 MG: 100 CAPSULE, LIQUID FILLED ORAL at 07:37

## 2024-07-10 ASSESSMENT — PAIN DESCRIPTION - LOCATION
LOCATION: ABDOMEN;PERINEUM
LOCATION: ABDOMEN

## 2024-07-10 ASSESSMENT — PAIN SCALES - GENERAL
PAINLEVEL_OUTOF10: 3
PAINLEVEL_OUTOF10: 4

## 2024-07-10 ASSESSMENT — PAIN DESCRIPTION - DESCRIPTORS
DESCRIPTORS: SORE
DESCRIPTORS: CRAMPING

## 2024-07-10 NOTE — PROGRESS NOTES
Pt discharged home. Discharge instructions and education completed and patient verbalized a good understanding. Bands verified on patients and infant, see footprint sheet. Infant placed in car seat by parent.

## 2024-07-10 NOTE — LACTATION NOTE
This note was copied from a baby's chart.  Mom states she put baby to breast a few times right after he was born. MOB does not want to put baby to breast, and wants to exclusively pump. She primarily has been providing formula. MOB has a breast pump at home; LC measured for appropriate flange size. LC encouraged Mom to pump every 3 hours for 20 minutes. Mom states baby is taking about 30 ML of formula. Encouraged MOB to feed baby her pumped milk and to supplement the remaining with formula. Explained how to eventually wean from formula to exclusively pumped breast milk. Engorgement care and signs of mastitis explained. Breastfeeding booklet and WARM line information provided. All questions answered.     Discussed with mother her plan for feeding.  Reviewed the benefits of exclusive breast milk feeding during the hospital stay.  She acknowledges understanding of information reviewed and states that it is her plan to breastfeed and formula feed her infant.  Will support her choice and offer additional information as needed.     Reviewed breastfeeding basics:  How milk is made and normal  breastfeeding behaviors discussed.  Supply and demand,  stomach size, early feeding cues. normal  feeding frequency and duration and expected infant output discussed.  Breastfeeding Booklet and Warm line information provided with discussion.  Discussed typical  weight loss and the importance of pediatrician appointment within 24-48 hours of discharge, at 2 weeks of life and normalcy of requesting pediatric weight checks as needed in between visits.    Engorgement Care Guidelines:  Reviewed how milk is made and normal phases of milk production.  Taught care of engorged breasts - physiologic breastfeeding encouraged with use of cool packs (no ice directly on skin). Consider use of NSAIDS where appropriate for discomfort and inflammation. Can employ light touch, lymphatic drainage techniques on tender

## 2024-07-10 NOTE — PROGRESS NOTES
Post-Partum Day Number 2 Progress/Discharge Note    Patient doing well post-partum without significant complaint.      Vitals:  Patient Vitals for the past 8 hrs:   BP Temp Temp src Pulse Resp SpO2   07/10/24 0721 101/72 97.7 °F (36.5 °C) Oral 88 20 100 %     Temp (24hrs), Av.8 °F (36.6 °C), Min:97.5 °F (36.4 °C), Max:98.2 °F (36.8 °C)      Vital signs stable, afebrile.    Exam:  Patient without distress.               Abdomen soft, fundus firm below umbilicus, non tender                             Lower extremities are negative for swelling, cords or tenderness.    Lab/Data Review:  All lab results for the last 24 hours reviewed.    Assessment and Plan:  Patient appears to be having uncomplicated post-partum course.  Continue routine perineal care and maternal education.  Plan discharge for today with follow up in our office in 10-14 days  2. Hx of depression on zoloft--follow up as above    Nabeel Meredith MD  7/10/2024

## 2024-10-15 LAB
ABO, EXTERNAL RESULT: NORMAL
HEP B, EXTERNAL RESULT: NEGATIVE
HIV, EXTERNAL RESULT: NEGATIVE
RPR, EXTERNAL RESULT: NON REACTIVE
RUBELLA TITER, EXTERNAL RESULT: NORMAL

## 2025-01-09 ENCOUNTER — ROUTINE PRENATAL (OUTPATIENT)
Age: 25
End: 2025-01-09
Payer: MEDICAID

## 2025-01-09 VITALS — DIASTOLIC BLOOD PRESSURE: 76 MMHG | SYSTOLIC BLOOD PRESSURE: 112 MMHG | HEART RATE: 97 BPM

## 2025-01-09 DIAGNOSIS — Z3A.21 21 WEEKS GESTATION OF PREGNANCY: Primary | ICD-10-CM

## 2025-01-09 DIAGNOSIS — O99.210 OBESITY IN PREGNANCY, ANTEPARTUM: ICD-10-CM

## 2025-01-09 PROCEDURE — 76816 OB US FOLLOW-UP PER FETUS: CPT | Performed by: STUDENT IN AN ORGANIZED HEALTH CARE EDUCATION/TRAINING PROGRAM

## 2025-01-09 PROCEDURE — 76817 TRANSVAGINAL US OBSTETRIC: CPT | Performed by: STUDENT IN AN ORGANIZED HEALTH CARE EDUCATION/TRAINING PROGRAM

## 2025-01-09 PROCEDURE — 99204 OFFICE O/P NEW MOD 45 MIN: CPT | Performed by: STUDENT IN AN ORGANIZED HEALTH CARE EDUCATION/TRAINING PROGRAM

## 2025-01-09 NOTE — PROCEDURES
PATIENT: DARIA HARVEY   -  : 2000   -  DOS:2025   -  INTERPRETING PROVIDER:Miriam Goodwin,   Indication  ========    Anatomy, Obesity (Prepreg BMI 44)    Method  ======    Transabdominal and transvaginal ultrasound examination. View: suboptimal due to maternal acoustic properties and unfavorable fetal position    Dating  ======    LMP on: 2024  GA by LMP 20 w + 6 d  GLENROY by LMP: 2025  Previous Ultrasound on: 10/15/2024  Type of prior assessment: GA  GA at prior assessment date 8 w + 5 d  GA by previous U/S 21 w + 0 d  GLENROY by previous Ultrasound: 2025  Ultrasound examination on: 2025  GA by U/S based upon: AC, BPD, Femur, HC  GA by U/S 21 w + 1 d  GLENROY by U/S: 2025  Assigned: based on the LMP, selected on 2025  Assigned GA 20 w + 6 d  Assigned GLENROY: 2025    Fetal Growth Overview  =================    Exam date        GA              BPD (mm)          HC (mm)              AC (mm)              FL (mm)             HL (mm)          EFW (g)  2025          20w 6d        50.1    62%        183.2     34%        164.3    64%        35.9     60%        33    57%        418     71%    Fetal Biometry  ============    Standard  BPD 50.1 mm 21w 1d 62% Hadlock  OFD 64.0 mm 21w 5d 80% Carlotta  .2 mm 20w 5d 34% Hadlock  Cerebellum tr 20.7 mm 19w 5d 25% Hill  Nuchal fold 3.6 mm  .3 mm 21w 3d 64% Hadlock  Femur 35.9 mm 21w 3d 60% Hadlock  Humerus 33.0 mm 21w 1d 57% Carlotta   g 21w 2d 71% Hadlock  EFW (lb) 0 lb  EFW (oz) 15 oz  EFW by: Hadlock (BPD-HC-AC-FL)  Extended  CM 3.5 mm  6% Nicolaides  Nasal bone 6.2 mm  Head / Face / Neck  Nasal bone: present  Other Structures   bpm    General Evaluation  ==============    Cardiac activity present.  bpm. Fetal movements: visualized. Presentation: Cephalic  Placenta: Placental site: anterior, fundal  Umbilical cord: Cord vessels: 3 vessel cord. Insertion site: central  Amniotic fluid: Amount of AF: normal.

## 2025-01-09 NOTE — PROGRESS NOTES
Patient was seen 1/9/2025      Please look under media to view full consult and ultrasound report in ViewPoint.         Miriam Goodwin MD   Maternal Fetal Medicine

## 2025-02-05 ENCOUNTER — ROUTINE PRENATAL (OUTPATIENT)
Age: 25
End: 2025-02-05
Payer: MEDICAID

## 2025-02-05 VITALS — SYSTOLIC BLOOD PRESSURE: 116 MMHG | DIASTOLIC BLOOD PRESSURE: 78 MMHG | HEART RATE: 90 BPM

## 2025-02-05 DIAGNOSIS — O99.210 OBESITY IN PREGNANCY, ANTEPARTUM: Primary | ICD-10-CM

## 2025-02-05 DIAGNOSIS — O99.210 OBESITY IN PREGNANCY, ANTEPARTUM: ICD-10-CM

## 2025-02-05 DIAGNOSIS — Z3A.24 24 WEEKS GESTATION OF PREGNANCY: Primary | ICD-10-CM

## 2025-02-05 PROCEDURE — 99214 OFFICE O/P EST MOD 30 MIN: CPT

## 2025-02-05 PROCEDURE — 76816 OB US FOLLOW-UP PER FETUS: CPT | Performed by: STUDENT IN AN ORGANIZED HEALTH CARE EDUCATION/TRAINING PROGRAM

## 2025-02-05 NOTE — PROGRESS NOTES
Assessment & Plan   ASSESSMENT/PLAN:  1. Obesity in pregnancy, antepartum    CINTIA is 24 yrs of age,  seen for a pregnancy complicated by:    AGA growth and normal MVP today.     Maternal Obesity (Pre-Pregnancy BMI 44):  - LR NIPT   - Previously counseled. See prior notes.   - Baseline labs reviewed CBC CMP normal PC 0.1   - Continue ldASA for preeclampsia prophylaxis. Can discontinue in the postpartum period, no known NSAID allergy. Discussed and patient will commence at this time   - Consider anesthesia consult in third trimester  - Recommend serial growth cans q4 weeks star ng at 28 weeks   -  testing weekly starting at 34 weeks   - Delivery: 39.0-39.6  - Fetal movement, PIH and PTL precautions reviewed.    Recommendations  Return in 4 weeks for Growth   - Recommend serial growth scans q4 weeks starting at 28 weeks   -  testing weekly starting at 34 weeks   - Delivery: 39.0-39.     Patient images have been reviewed. Agree with the plan of care as outlined above. Mis Bush MD , MS Maternal Fetal Medicine    Please see Viewpoint for ultrasound findings,     Subjective   Cintia Jenkins (:  2000) is a 24 y.o. female,Established patient, here for evaluation of the following chief complaint(s):  1. Obesity in pregnancy, antepartum    Objective   Physical Exam  Vitals reviewed.   Constitutional:       Appearance: Normal appearance.   Neurological:      Mental Status: She is alert.   Psychiatric:         Mood and Affect: Mood normal.         Judgment: Judgment normal.       On this date 2025 I have reviewed previous notes, test results and consulted face to face with the patient discussing the diagnosis and importance of compliance with the treatment plan as well as documenting on the day of the visit.    An electronic signature was used to authenticate this note.    --Christy Cody, RICHARD - CNP

## 2025-02-07 NOTE — PROCEDURES
PATIENT: DARIA HARVEY   -  : 2000   -  DOS:2025   -  INTERPRETING PROVIDER:Mis Bush,   Indication  ========    Obesity (Prepreg BMI 44), Suboptimal views    Method  ======    Transabdominal ultrasound examination. View: Good view    Pregnancy  =========    Barron pregnancy. Number of fetuses: 1    Dating  ======    LMP on: 2024  GA by LMP 24 w + 5 d  GLENROY by LMP: 2025  Previous Ultrasound on: 10/15/2024  Type of prior assessment: GA  GA at prior assessment date 8 w + 5 d  GA by previous U/S 24 w + 6 d  GLENROY by previous Ultrasound: 2025  Ultrasound examination on: 2025  GA by U/S based upon: AC, BPD, Femur, HC  GA by U/S 25 w + 2 d  GLENROY by U/S: 2025  Assigned: based on the LMP, selected on 2025  Assigned GA 24 w + 5 d  Assigned GLENROY: 2025    Fetal Biometry  ============    Standard  BPD 63.2 mm 25w 4d 73% Hadlock  OFD 79.5 mm 25w 6d 86% Carlotta  .5 mm 24w 5d 32% Hadlock  .2 mm 24w 5d 39% Hadlock  Femur 47.7 mm 26w 0d 75% Hadlock   g 25w 0d 61% Hadlock  EFW (lb) 1 lb  EFW (oz) 12 oz  EFW by: Hadlock (BPD-HC-AC-FL)  Other Structures   bpm    General Evaluation  ==============    Cardiac activity present.  bpm. Fetal movements: visualized. Presentation: Cephalic  Placenta: Placental site: anterior, fundal  Umbilical cord: Cord vessels: 3 vessel cord. Insertion site: central  Amniotic fluid: Amount of AF: normal. MVP 6.8 cm    Fetal Anatomy  ===========    Heart / Thorax  Aortic arch view: normal  Ductal arch view: normal  Stomach: normal  Kidneys: normal  Bladder: normal  Wants to know fetal sex: yes    Findings  =======    Intrauterine Barron pregnancy at 24w 5d by clinical dates.  EFW is 781 g at 61%, abdominal circumference at 39%.  Amniotic fluid: normal.  Placenta is anterior, fundal.  Cephalic presentation.    Previously, the fetal anatomy survey was incomplete. Today, the remaining structures were visualized and

## 2025-03-04 DIAGNOSIS — O99.210 OBESITY IN PREGNANCY, ANTEPARTUM: Primary | ICD-10-CM

## 2025-03-05 ENCOUNTER — ROUTINE PRENATAL (OUTPATIENT)
Age: 25
End: 2025-03-05
Payer: MEDICAID

## 2025-03-05 VITALS — DIASTOLIC BLOOD PRESSURE: 76 MMHG | HEART RATE: 103 BPM | SYSTOLIC BLOOD PRESSURE: 119 MMHG

## 2025-03-05 DIAGNOSIS — O99.210 OBESITY IN PREGNANCY, ANTEPARTUM: Primary | ICD-10-CM

## 2025-03-05 DIAGNOSIS — O40.3XX0 POLYHYDRAMNIOS AFFECTING PREGNANCY IN THIRD TRIMESTER: ICD-10-CM

## 2025-03-05 DIAGNOSIS — O26.90 PREGNANCY COMPLICATION, ANTEPARTUM: Primary | ICD-10-CM

## 2025-03-05 DIAGNOSIS — O99.210 OBESITY IN PREGNANCY, ANTEPARTUM: ICD-10-CM

## 2025-03-05 PROCEDURE — 76816 OB US FOLLOW-UP PER FETUS: CPT | Performed by: STUDENT IN AN ORGANIZED HEALTH CARE EDUCATION/TRAINING PROGRAM

## 2025-03-05 PROCEDURE — 99214 OFFICE O/P EST MOD 30 MIN: CPT

## 2025-03-05 NOTE — PROCEDURES
fundal.  Cephalic presentation.    Biophysical profile score is 8/8.      The ultrasound findings as listed above and diagnostic limitations of ultrasound imaging, including inability to exclude all anomalies, have been reviewed with the patient. All  questions and concerns addressed.    Consultation  ==========    NP note 3/5/2025    HARMAN is 25 yrs of age,  seen for a pregnancy complicated by:    BPP 8/8 and mild polyhydramnios today.    Maternal Obesity (Pre-Pregnancy BMI 44):  - LR NIPT  - Previously counseled. See prior notes.  - Baseline labs reviewed CBC CMP normal PC 0.1  - Continue ldASA for preeclampsia prophylaxis. Can discontinue in the postpartum period, no known NSAID allergy. Discussed and patient will commence at this time  - Consider anesthesia consult in third trimester  - Recommend serial growth cans q4 weeks starting at 28 weeks  -  testing weekly starting at 34 weeks  - Delivery: 39.0-39.6  - Kick count instructions, PIH and PTL precautions reviewed.    Mild Polyhydramnios (BRENDA 25.1)  - 25: early 1HR GTT 98 Passed. Recommend repeat 1hr gtt now.  - Growth scan in 4 weeks  - No indication for ANT at this time  - Rec delivery between 39.0-39.6    Patient images have been reviewed. Agree with the plan of care as outlined above.  Miriam Goodwin MD  Maternal Fetal Medicine    Recommendations  ==============    Return in 4 weeks for Growth  Recommend repeat 1hr gtt now.    - Recommend serial growth cans q4 weeks starting at 28 weeks  -  testing weekly starting at 32 weeks (BMI, poly)  - Delivery: 39.0-39.6    Coding  ======    Code: 63106  Description: Ultrasound, pregnant uterus, real time with image documentation, follow up, transabdominal approach per fetus

## 2025-03-05 NOTE — PROGRESS NOTES
Patient was seen 3/5/2025      Please look under media to view full consult and ultrasound report in ViewPoint.         Miriam Goodwin MD   Maternal Fetal Medicine

## 2025-03-05 NOTE — PROGRESS NOTES
documenting on the day of the visit.  An electronic signature was used to authenticate this note.    --RICHARD Jackson - CNP

## 2025-04-04 ENCOUNTER — ROUTINE PRENATAL (OUTPATIENT)
Age: 25
End: 2025-04-04
Payer: MEDICAID

## 2025-04-04 VITALS — HEART RATE: 115 BPM | SYSTOLIC BLOOD PRESSURE: 115 MMHG | DIASTOLIC BLOOD PRESSURE: 69 MMHG

## 2025-04-04 DIAGNOSIS — O99.210 OBESITY IN PREGNANCY, ANTEPARTUM: ICD-10-CM

## 2025-04-04 DIAGNOSIS — Z3A.33 33 WEEKS GESTATION OF PREGNANCY: Primary | ICD-10-CM

## 2025-04-04 PROCEDURE — 76819 FETAL BIOPHYS PROFIL W/O NST: CPT | Performed by: OBSTETRICS & GYNECOLOGY

## 2025-04-04 PROCEDURE — 76816 OB US FOLLOW-UP PER FETUS: CPT | Performed by: OBSTETRICS & GYNECOLOGY

## 2025-04-04 NOTE — PROCEDURES
PATIENT: DARIA HARVEY   -  : 2000   -  DOS:2025   -  INTERPRETING PROVIDER:Oni Rivera,   Indication  ========    Obesity (Prepreg BMI 44)    Method  ======    Transabdominal ultrasound examination. View: Sufficient    Pregnancy  =========    Barron pregnancy. Number of fetuses: 1    Dating  ======    LMP on: 2024  GA by LMP 33 w + 0 d  GLENROY by LMP: 2025  Previous Ultrasound on: 10/15/2024  Type of prior assessment: GA  GA at prior assessment date 8 w + 5 d  GA by previous U/S 33 w + 1 d  GLENROY by previous Ultrasound: 2025  Ultrasound examination on: 2025  GA by U/S based upon: AC, BPD, Femur, HC  GA by U/S 33 w + 3 d  GLENROY by U/S: 2025  Assigned: based on the LMP, selected on 2025  Assigned GA 33 w + 0 d  Assigned GLENROY: 2025    Fetal Biometry  ============    Standard  BPD 84.0 mm 33w 6d 68% Hadlock  .5 mm 36w 2d 98% Carlotta  .7 mm 34w 4d 55% Hadlock  .2 mm 33w 1d 55% Hadlock  Femur 61.6 mm 32w 0d 14% Hadlock  EFW 2,095 g 32w 5d 41% Hadlock  EFW (lb) 4 lb  EFW (oz) 10 oz  EFW by: Hadlock (BPD-HC-AC-FL)  Other Structures   bpm    General Evaluation  ==============    Cardiac activity present.  bpm. Fetal movements: visualized. Presentation: Cephalic  Placenta: Placental site: anterior, fundal  Umbilical cord: Cord vessels: 3 vessel cord. Insertion site: central  Amniotic fluid: Amount of AF: normal. MVP 6.0 cm. BRENDA 19.8 cm. Q1 6.0 cm, Q2 5.1 cm, Q3 3.6 cm, Q4 5.1 cm    Fetal Anatomy  ===========    Stomach: normal  Kidneys: normal  Bladder: normal  Wants to know fetal sex: yes    Biophysical Profile  ==============    2: Fetal breathing movements  2: Gross body movements  2: Fetal tone  2: Amniotic fluid volume  8 Biophysical profile score    Findings  =======    Intrauterine Barron pregnancy at 33w 0d by clinical dates.  EFW is 2095 g at 41%, abdominal circumference at 55%.  Anatomy visualized as stated above.  Amniotic

## 2025-04-18 ENCOUNTER — ROUTINE PRENATAL (OUTPATIENT)
Age: 25
End: 2025-04-18
Payer: MEDICAID

## 2025-04-18 VITALS
SYSTOLIC BLOOD PRESSURE: 117 MMHG | DIASTOLIC BLOOD PRESSURE: 83 MMHG | HEIGHT: 69 IN | HEART RATE: 90 BPM | OXYGEN SATURATION: 97 % | RESPIRATION RATE: 20 BRPM | BODY MASS INDEX: 45.48 KG/M2

## 2025-04-18 DIAGNOSIS — O99.210 OBESITY IN PREGNANCY, ANTEPARTUM: ICD-10-CM

## 2025-04-18 DIAGNOSIS — O99.210 OBESITY IN PREGNANCY, ANTEPARTUM: Primary | ICD-10-CM

## 2025-04-18 PROCEDURE — 99214 OFFICE O/P EST MOD 30 MIN: CPT

## 2025-04-18 PROCEDURE — 76819 FETAL BIOPHYS PROFIL W/O NST: CPT | Performed by: OBSTETRICS & GYNECOLOGY

## 2025-04-18 NOTE — PROGRESS NOTES
Assessment & Plan   ASSESSMENT/PLAN:  1. Obesity in pregnancy, antepartum    CINTIA is 25 yrs of age,  seen for a pregnancy complicated by:     BPP  and normal AFV     Maternal Obesity (Pre-Pregnancy BMI 44):  - 25: early 1HR GTT 98 Passed  - LR NIPT  - Previously counseled. See prior notes.   - Baseline labs reviewed CBC CMP normal PC 0.1   - Continue ldASA for preeclampsia prophylaxis. Can discontinue in the postpartum period, no known NSAID allergy.   - Consider anesthesia consult in third trimester   - Recommend serial growth cans q4 weeks   -  testing weekly starting at 34 weeks   - Delivery: 39.0-39.6   - Kick count instructions, PIH and PTL precautions reviewed    Recommendations  - Weekly ANT  - Recommend serial growth scans q4 weeks  - Delivery: 39.0-39.6     2025 1144 I have reviewed the ultrasound images from today. I have reviewed and approved the NP care/treatment plan, as outlined above.  Eugenio Gomez MD  Maternal/Fetal Medicine    Please see Viewpoint for ultrasound findings.    Subjective   Cintia Jenkins (:  2000) is a 25 y.o. female,Established patient, here for evaluation of the following chief complaint(s):  1. Obesity in pregnancy, antepartum    Objective   Physical Exam  Vitals reviewed.   Constitutional:       Appearance: Normal appearance.   Neurological:      Mental Status: She is alert.   Psychiatric:         Mood and Affect: Mood normal.         Judgment: Judgment normal.       On this date 2025 I have spent time reviewing previous notes, test results and discussing the diagnosis and importance of compliance with the treatment plan face to face with the patient as well as documenting on the day of the visit.  An electronic signature was used to authenticate this note.    --Christy Cody, RICHARD - CNP

## 2025-04-18 NOTE — PROGRESS NOTES
Patient was seen 4/18/2025      Please look under media to view full consult and ultrasound report in ViewPoint.         Eugenio Gomez MD  Maternal Fetal Medicine

## 2025-04-18 NOTE — PROCEDURES
PATIENT: DARIA HARVEY   -  : 2000   -  DOS:2025   -  INTERPRETING PROVIDER:Eugenio Gomez,   Indication  ========    Obesity (Prepreg BMI 44)    Method  ======    Transabdominal ultrasound examination. View: Sufficient    Pregnancy  =========    Barron pregnancy. Number of fetuses: 1    Dating  ======    LMP on: 2024  GA by LMP 35 w + 0 d  GLENROY by LMP: 2025  Previous Ultrasound on: 10/15/2024  Type of prior assessment: GA  GA at prior assessment date 8 w + 5 d  GA by previous U/S 35 w + 1 d  GLENROY by previous Ultrasound: 2025  Assigned: based on the LMP, selected on 2025  Assigned GA 35 w + 0 d  Assigned GLENROY: 2025    General Evaluation  ==============    Cardiac activity present.  bpm. Fetal movements: visualized. Presentation: Cephalic  Placenta: Placental site: anterior, fundal  Umbilical cord: Cord vessels: 3 vessel cord    Fetal Anatomy  ===========    Stomach: normal  Kidneys: normal  Bladder: normal  Wants to know fetal sex: yes    Amniotic Fluid Assessment  =====================    Amount of AF: normal  MVP 6.5 cm. BRENDA 22.9 cm. Q1 6.0 cm, Q2 4.3 cm, Q3 6.1 cm, Q4 6.5 cm    Biophysical Profile  ==============    2: Fetal breathing movements  2: Gross body movements  2: Fetal tone  2: Amniotic fluid volume  8/8 Biophysical profile score    Findings  =======    Intrauterine Barron pregnancy at 35w 0d by clinical dates.  Amniotic fluid: normal.  Placenta is anterior, fundal.  Cephalic presentation.  Biophysical profile score is 8/8.    The ultrasound findings as listed above and diagnostic limitations of ultrasound imaging, including inability to exclude all anomalies, have been reviewed with the patient. All  questions and concerns addressed.    Consultation  ==========    NP note     HARMAN is 25 yrs of age,  seen for a pregnancy complicated by:    BPP 8/8 and normal AFV    Maternal Obesity (Pre-Pregnancy BMI 44):  - 25: early 1HR GTT 98

## 2025-04-18 NOTE — PROGRESS NOTES
Chief Complaint   Patient presents with    ANTON BPP           Vitals:    04/18/25 1036   BP: 131/83   Pulse: 90   Resp: 20   SpO2: 97%            1. Have you been to the ER, urgent care clinic since your last visit?  Hospitalized since your last visit?  No  2. Have you seen or consulted any other health care providers outside of the [facility] since your last visit?  Include any pap smears or colon screening. No

## 2025-04-25 ENCOUNTER — ROUTINE PRENATAL (OUTPATIENT)
Age: 25
End: 2025-04-25
Payer: MEDICAID

## 2025-04-25 VITALS — DIASTOLIC BLOOD PRESSURE: 85 MMHG | HEART RATE: 103 BPM | SYSTOLIC BLOOD PRESSURE: 128 MMHG

## 2025-04-25 DIAGNOSIS — O99.210 OBESITY IN PREGNANCY, ANTEPARTUM: ICD-10-CM

## 2025-04-25 DIAGNOSIS — R45.89 EMOTIONAL DISTRESS: ICD-10-CM

## 2025-04-25 DIAGNOSIS — O99.210 OBESITY IN PREGNANCY, ANTEPARTUM: Primary | ICD-10-CM

## 2025-04-25 PROCEDURE — 99214 OFFICE O/P EST MOD 30 MIN: CPT

## 2025-04-25 PROCEDURE — 76819 FETAL BIOPHYS PROFIL W/O NST: CPT | Performed by: OBSTETRICS & GYNECOLOGY

## 2025-04-25 NOTE — PROGRESS NOTES
Patient was seen 4/25/2025      Please look under media to view full consult and ultrasound report in ViewPoint.         Eugenio Gomez MD  Maternal Fetal Medicine

## 2025-04-25 NOTE — PROGRESS NOTES
Assessment & Plan   ASSESSMENT/PLAN:  1. Obesity in pregnancy, antepartum  2. BMI 40.0-44.9, adult (HCC)  3. Emotional distress    CINTIA is 25 yrs of age,  seen for a pregnancy complicated by:     BPP  and normal BRENDA     Maternal Obesity (Pre-Pregnancy BMI 44):  - 25: early 1HR GTT 98 Passed  - LR NIPT  - Previously counseled. See prior notes.   - Baseline labs reviewed CBC CMP normal PC 0.1   - Continue ldASA for preeclampsia prophylaxis. Can discontinue in the postpartum period, no known NSAID allergy.   - Consider anesthesia consult in third trimester   - Recommend serial growth cans q4 weeks   -  testing weekly starting at 34 weeks   - Delivery: 39.0-39.6   - Kick count instructions, PIH and PTL precautions reviewed     Emotional Distress  - Pt tearfully today r/t strained relationship with FOB.  - Denies SI, HI, precautions reviewed.   - Information for [facility] given to pt for counseling resources.      Recommendations  - Weekly ANT  - Recommend serial growth scans q4 weeks  - Delivery: 39.0-39.6     2025 1333 I have reviewed the ultrasound images from today. I have reviewed and approved the NP care/treatment plan.  Eugenio Gomez MD  Maternal/Fetal Medicine     Please see Viewpoint for ultrasound findings.     Subjective   Cintia Jenkins (:  2000) is a 25 y.o. female,Established patient, here for evaluation of the following chief complaint(s):  1. Obesity in pregnancy, antepartum  2. BMI 40.0-44.9, adult (HCC)  3. Emotional distress    Objective   Physical Exam  Vitals reviewed.   Constitutional:       Appearance: Normal appearance.   Neurological:      Mental Status: She is alert.   Psychiatric:         Mood and Affect: Mood normal.         Judgment: Judgment normal.       On this date 2025  I have spent time reviewing previous notes, test results and discussing the diagnosis and importance of compliance with the treatment plan face to face with the patient

## 2025-04-25 NOTE — PROCEDURES
PATIENT: DARIA HARVEY   -  : 2000   -  DOS:2025   -  INTERPRETING PROVIDER:Eugenio Gomez,   Indication  ========    Obesity ( BMI 44)    Method  ======    Transabdominal ultrasound examination. View: Good view    Pregnancy  =========    Barron pregnancy. Number of fetuses: 1    Dating  ======    LMP on: 2024  GA by LMP 36 w + 0 d  GLENROY by LMP: 2025  Previous Ultrasound on: 10/15/2024  Type of prior assessment: GA  GA at prior assessment date 8 w + 5 d  GA by previous U/S 36 w + 1 d  GLENROY by previous Ultrasound: 2025  Assigned: based on the LMP, selected on 2025  Assigned GA 36 w + 0 d  Assigned GLENROY: 2025    General Evaluation  ==============    Cardiac activity present.  bpm. Fetal movements: visualized. Presentation: Cephalic  Placenta: Placental site: anterior, fundal  Umbilical cord: Cord vessels: 3 vessel cord    Fetal Anatomy  ===========    Stomach: normal  Kidneys: normal  Bladder: normal  Wants to know fetal sex: yes    Amniotic Fluid Assessment  =====================    Amount of AF: normal  MVP 7.7 cm. BRENDA 24.8 cm. Q1 3.9 cm, Q2 5.6 cm, Q3 7.7 cm, Q4 7.6 cm    Biophysical Profile  ==============    2: Fetal breathing movements  2: Gross body movements  2: Fetal tone  2: Amniotic fluid volume  8/8 Biophysical profile score    Findings  =======    Intrauterine Barron pregnancy at 36w 0d by clinical dates.  Amniotic fluid: normal.  Placenta is anterior, fundal.  Cephalic presentation.  Biophysical profile score is 8/8.    The ultrasound findings as listed above and diagnostic limitations of ultrasound imaging, including inability to exclude all anomalies, have been reviewed with the patient. All  questions and concerns addressed.    Consultation  ==========    NP note 25    HARMAN is 25 yrs of age,  seen for a pregnancy complicated by:    BPP 8/8 and normal BRENDA    Maternal Obesity (Pre-Pregnancy BMI 44):  - 25: early 1HR GTT 98

## 2025-05-09 ENCOUNTER — ROUTINE PRENATAL (OUTPATIENT)
Age: 25
End: 2025-05-09
Payer: MEDICAID

## 2025-05-09 VITALS — HEART RATE: 85 BPM | SYSTOLIC BLOOD PRESSURE: 110 MMHG | DIASTOLIC BLOOD PRESSURE: 73 MMHG

## 2025-05-09 DIAGNOSIS — R45.89 EMOTIONAL DISTRESS: ICD-10-CM

## 2025-05-09 DIAGNOSIS — O99.210 OBESITY IN PREGNANCY, ANTEPARTUM: ICD-10-CM

## 2025-05-09 DIAGNOSIS — O40.3XX0 POLYHYDRAMNIOS AFFECTING PREGNANCY IN THIRD TRIMESTER: ICD-10-CM

## 2025-05-09 PROCEDURE — 76816 OB US FOLLOW-UP PER FETUS: CPT | Performed by: OBSTETRICS & GYNECOLOGY

## 2025-05-09 PROCEDURE — 99214 OFFICE O/P EST MOD 30 MIN: CPT

## 2025-05-09 PROCEDURE — 76819 FETAL BIOPHYS PROFIL W/O NST: CPT | Performed by: OBSTETRICS & GYNECOLOGY

## 2025-05-09 NOTE — PROGRESS NOTES
Patient was seen 5/9/2025      Please look under media to view full consult and ultrasound report in ViewPoint.         Eugenio Gomez MD  Maternal Fetal Medicine

## 2025-05-09 NOTE — PROCEDURES
PATIENT: DARIA HARVEY   -  : 2000   -  DOS:2025   -  INTERPRETING PROVIDER:Eugenio Gomez,   Indication  ========    Obesity (BMI 44)    Method  ======    Transabdominal ultrasound examination. View: Good view    Pregnancy  =========    Barron pregnancy. Number of fetuses: 1    Dating  ======    LMP on: 2024  GA by LMP 38 w + 0 d  GLENROY by LMP: 2025  Previous Ultrasound on: 10/15/2024  Type of prior assessment: GA  GA at prior assessment date 8 w + 5 d  GA by previous U/S 38 w + 1 d  GLENROY by previous Ultrasound: 2025  Ultrasound examination on: 2025  GA by U/S based upon: AC, BPD, Femur, HC  GA by U/S 36 w + 3 d  GLENROY by U/S: 6/3/2025  Assigned: based on the LMP, selected on 2025  Assigned GA 38 w + 0 d  Assigned GLENROY: 2025    Fetal Biometry  ============    Standard  BPD 88.4 mm 35w 5d 15% Hadlock  .4 mm 37w 3d 44% Carlotta  .9 mm 35w 6d 3% Hadlock  .1 mm 37w 5d 60% Hadlock  Femur 71.3 mm 36w 4d 18% Hadlock  EFW 3,088 g 37w 2d 37% Hadlock  EFW (lb) 6 lb  EFW (oz) 13 oz  EFW by: Hadlock (BPD-HC-AC-FL)  Other Structures   bpm    General Evaluation  ==============    Cardiac activity present.  bpm. Fetal movements: visualized. Presentation: Cephalic  Placenta: Placental site: anterior, fundal  Umbilical cord: Cord vessels: 3 vessel cord  Amniotic fluid: Amount of AF: normal. MVP 5.1 cm. BRENDA 12.6 cm. Q1 3.0 cm, Q2 4.5 cm, Q3 5.1 cm, Q4 0.0 cm    Fetal Anatomy  ===========    Stomach: normal  Kidneys: normal  Bladder: normal  Wants to know fetal sex: yes    Biophysical Profile  ==============    2: Fetal breathing movements  2: Gross body movements  2: Fetal tone  2: Amniotic fluid volume   Biophysical profile score    Findings  =======    Intrauterine Barron pregnancy at 38w 0d by clinical dates.  EFW is 3088 g at 37%, abdominal circumference at 60%.  Anatomy visualized as stated above.  Amniotic fluid: normal.  Placenta is anterior,

## 2025-05-09 NOTE — PROGRESS NOTES
reviewing previous notes, test results and discussing the diagnosis and importance of compliance with the treatment plan face to face with the patient as well as documenting on the day of the visit.  An electronic signature was used to authenticate this note.    --RICHARD Jackson - CNP

## 2025-05-16 ENCOUNTER — ANESTHESIA (OUTPATIENT)
Facility: HOSPITAL | Age: 25
End: 2025-05-16
Payer: MEDICAID

## 2025-05-16 ENCOUNTER — HOSPITAL ENCOUNTER (OUTPATIENT)
Facility: HOSPITAL | Age: 25
Discharge: HOME | DRG: 560 | End: 2025-05-16
Attending: STUDENT IN AN ORGANIZED HEALTH CARE EDUCATION/TRAINING PROGRAM | Admitting: OBSTETRICS & GYNECOLOGY
Payer: MEDICAID

## 2025-05-16 ENCOUNTER — HOSPITAL ENCOUNTER (INPATIENT)
Facility: HOSPITAL | Age: 25
LOS: 2 days | Discharge: HOME | DRG: 560 | End: 2025-05-18
Attending: STUDENT IN AN ORGANIZED HEALTH CARE EDUCATION/TRAINING PROGRAM | Admitting: STUDENT IN AN ORGANIZED HEALTH CARE EDUCATION/TRAINING PROGRAM
Payer: MEDICAID

## 2025-05-16 ENCOUNTER — ANESTHESIA EVENT (OUTPATIENT)
Facility: HOSPITAL | Age: 25
End: 2025-05-16
Payer: MEDICAID

## 2025-05-16 VITALS
HEART RATE: 85 BPM | BODY MASS INDEX: 43.4 KG/M2 | HEIGHT: 69 IN | OXYGEN SATURATION: 98 % | RESPIRATION RATE: 18 BRPM | TEMPERATURE: 98.1 F | DIASTOLIC BLOOD PRESSURE: 84 MMHG | SYSTOLIC BLOOD PRESSURE: 131 MMHG | WEIGHT: 293 LBS

## 2025-05-16 PROBLEM — Z37.9 NORMAL LABOR: Status: RESOLVED | Noted: 2025-05-16 | Resolved: 2025-05-16

## 2025-05-16 PROBLEM — Z3A.39 39 WEEKS GESTATION OF PREGNANCY: Status: ACTIVE | Noted: 2025-01-09

## 2025-05-16 PROBLEM — Z37.9 NORMAL LABOR: Status: ACTIVE | Noted: 2025-05-16

## 2025-05-16 LAB
ABO + RH BLD: NORMAL
BASOPHILS # BLD: 0.02 K/UL (ref 0–0.1)
BASOPHILS NFR BLD: 0.2 % (ref 0–1)
BLOOD GROUP ANTIBODIES SERPL: NORMAL
DIFFERENTIAL METHOD BLD: ABNORMAL
EOSINOPHIL # BLD: 0.09 K/UL (ref 0–0.4)
EOSINOPHIL NFR BLD: 0.9 % (ref 0–7)
ERYTHROCYTE [DISTWIDTH] IN BLOOD BY AUTOMATED COUNT: 14.6 % (ref 11.5–14.5)
HCT VFR BLD AUTO: 34.5 % (ref 35–47)
HGB BLD-MCNC: 11.4 G/DL (ref 11.5–16)
IMM GRANULOCYTES # BLD AUTO: 0.05 K/UL (ref 0–0.04)
IMM GRANULOCYTES NFR BLD AUTO: 0.5 % (ref 0–0.5)
LYMPHOCYTES # BLD: 1.62 K/UL (ref 0.8–3.5)
LYMPHOCYTES NFR BLD: 16 % (ref 12–49)
MCH RBC QN AUTO: 28.3 PG (ref 26–34)
MCHC RBC AUTO-ENTMCNC: 33 G/DL (ref 30–36.5)
MCV RBC AUTO: 85.6 FL (ref 80–99)
MONOCYTES # BLD: 0.41 K/UL (ref 0–1)
MONOCYTES NFR BLD: 4 % (ref 5–13)
NEUTS SEG # BLD: 7.95 K/UL (ref 1.8–8)
NEUTS SEG NFR BLD: 78.4 % (ref 32–75)
NRBC # BLD: 0 K/UL (ref 0–0.01)
NRBC BLD-RTO: 0 PER 100 WBC
PLATELET # BLD AUTO: 238 K/UL (ref 150–400)
PMV BLD AUTO: 11.4 FL (ref 8.9–12.9)
RBC # BLD AUTO: 4.03 M/UL (ref 3.8–5.2)
SPECIMEN EXP DATE BLD: NORMAL
WBC # BLD AUTO: 10.1 K/UL (ref 3.6–11)

## 2025-05-16 PROCEDURE — G0378 HOSPITAL OBSERVATION PER HR: HCPCS

## 2025-05-16 PROCEDURE — 2580000003 HC RX 258: Performed by: FAMILY MEDICINE

## 2025-05-16 PROCEDURE — 7210000100 HC LABOR FEE PER 1 HR: Performed by: OBSTETRICS & GYNECOLOGY

## 2025-05-16 PROCEDURE — 36415 COLL VENOUS BLD VENIPUNCTURE: CPT

## 2025-05-16 PROCEDURE — 3700000156 HC EPIDURAL ANESTHESIA: Performed by: NURSE ANESTHETIST, CERTIFIED REGISTERED

## 2025-05-16 PROCEDURE — 86780 TREPONEMA PALLIDUM: CPT

## 2025-05-16 PROCEDURE — G0379 DIRECT REFER HOSPITAL OBSERV: HCPCS

## 2025-05-16 PROCEDURE — 4A1HXCZ MONITORING OF PRODUCTS OF CONCEPTION, CARDIAC RATE, EXTERNAL APPROACH: ICD-10-PCS | Performed by: OBSTETRICS & GYNECOLOGY

## 2025-05-16 PROCEDURE — 99212 OFFICE O/P EST SF 10 MIN: CPT

## 2025-05-16 PROCEDURE — 86901 BLOOD TYPING SEROLOGIC RH(D): CPT

## 2025-05-16 PROCEDURE — 6370000000 HC RX 637 (ALT 250 FOR IP): Performed by: OBSTETRICS & GYNECOLOGY

## 2025-05-16 PROCEDURE — 94761 N-INVAS EAR/PLS OXIMETRY MLT: CPT

## 2025-05-16 PROCEDURE — 86900 BLOOD TYPING SEROLOGIC ABO: CPT

## 2025-05-16 PROCEDURE — 2500000003 HC RX 250 WO HCPCS: Performed by: NURSE ANESTHETIST, CERTIFIED REGISTERED

## 2025-05-16 PROCEDURE — 7220000101 HC DELIVERY VAGINAL/SINGLE: Performed by: OBSTETRICS & GYNECOLOGY

## 2025-05-16 PROCEDURE — 6360000002 HC RX W HCPCS

## 2025-05-16 PROCEDURE — 86850 RBC ANTIBODY SCREEN: CPT

## 2025-05-16 PROCEDURE — 6360000002 HC RX W HCPCS: Performed by: FAMILY MEDICINE

## 2025-05-16 PROCEDURE — 10907ZC DRAINAGE OF AMNIOTIC FLUID, THERAPEUTIC FROM PRODUCTS OF CONCEPTION, VIA NATURAL OR ARTIFICIAL OPENING: ICD-10-PCS | Performed by: OBSTETRICS & GYNECOLOGY

## 2025-05-16 PROCEDURE — 1120000000 HC RM PRIVATE OB

## 2025-05-16 PROCEDURE — 85025 COMPLETE CBC W/AUTO DIFF WBC: CPT

## 2025-05-16 PROCEDURE — 3700000025 EPIDURAL BLOCK: Performed by: ANESTHESIOLOGY

## 2025-05-16 RX ORDER — SODIUM CHLORIDE 0.9 % (FLUSH) 0.9 %
5-40 SYRINGE (ML) INJECTION EVERY 12 HOURS SCHEDULED
Status: DISCONTINUED | OUTPATIENT
Start: 2025-05-16 | End: 2025-05-18 | Stop reason: HOSPADM

## 2025-05-16 RX ORDER — SODIUM CHLORIDE 9 MG/ML
INJECTION, SOLUTION INTRAVENOUS PRN
Status: DISCONTINUED | OUTPATIENT
Start: 2025-05-16 | End: 2025-05-18 | Stop reason: HOSPADM

## 2025-05-16 RX ORDER — ACETAMINOPHEN 500 MG
1000 TABLET ORAL EVERY 8 HOURS SCHEDULED
Status: DISCONTINUED | OUTPATIENT
Start: 2025-05-16 | End: 2025-05-18 | Stop reason: HOSPADM

## 2025-05-16 RX ORDER — MISOPROSTOL 200 UG/1
800 TABLET ORAL PRN
Status: DISCONTINUED | OUTPATIENT
Start: 2025-05-16 | End: 2025-05-18 | Stop reason: HOSPADM

## 2025-05-16 RX ORDER — IBUPROFEN 800 MG/1
800 TABLET, FILM COATED ORAL EVERY 8 HOURS SCHEDULED
Status: DISCONTINUED | OUTPATIENT
Start: 2025-05-16 | End: 2025-05-16

## 2025-05-16 RX ORDER — CARBOPROST TROMETHAMINE 250 UG/ML
250 INJECTION, SOLUTION INTRAMUSCULAR PRN
Status: DISCONTINUED | OUTPATIENT
Start: 2025-05-16 | End: 2025-05-16

## 2025-05-16 RX ORDER — ONDANSETRON 4 MG/1
4 TABLET, ORALLY DISINTEGRATING ORAL EVERY 8 HOURS PRN
Status: DISCONTINUED | OUTPATIENT
Start: 2025-05-16 | End: 2025-05-18 | Stop reason: HOSPADM

## 2025-05-16 RX ORDER — SODIUM CHLORIDE, SODIUM LACTATE, POTASSIUM CHLORIDE, AND CALCIUM CHLORIDE .6; .31; .03; .02 G/100ML; G/100ML; G/100ML; G/100ML
1000 INJECTION, SOLUTION INTRAVENOUS PRN
Status: DISCONTINUED | OUTPATIENT
Start: 2025-05-16 | End: 2025-05-16

## 2025-05-16 RX ORDER — METHYLERGONOVINE MALEATE 0.2 MG/ML
200 INJECTION INTRAVENOUS PRN
Status: DISCONTINUED | OUTPATIENT
Start: 2025-05-16 | End: 2025-05-18 | Stop reason: HOSPADM

## 2025-05-16 RX ORDER — OXYCODONE HYDROCHLORIDE 5 MG/1
5 TABLET ORAL EVERY 4 HOURS PRN
Status: DISCONTINUED | OUTPATIENT
Start: 2025-05-16 | End: 2025-05-18 | Stop reason: HOSPADM

## 2025-05-16 RX ORDER — OXYCODONE HYDROCHLORIDE 5 MG/1
10 TABLET ORAL EVERY 4 HOURS PRN
Status: DISCONTINUED | OUTPATIENT
Start: 2025-05-16 | End: 2025-05-18 | Stop reason: HOSPADM

## 2025-05-16 RX ORDER — FERROUS SULFATE 325(65) MG
325 TABLET ORAL EVERY OTHER DAY
Status: DISCONTINUED | OUTPATIENT
Start: 2025-05-16 | End: 2025-05-18 | Stop reason: HOSPADM

## 2025-05-16 RX ORDER — EPHEDRINE SULFATE/0.9% NACL/PF 25 MG/5 ML
10 SYRINGE (ML) INTRAVENOUS
Status: DISCONTINUED | OUTPATIENT
Start: 2025-05-16 | End: 2025-05-16

## 2025-05-16 RX ORDER — ONDANSETRON 2 MG/ML
4 INJECTION INTRAMUSCULAR; INTRAVENOUS EVERY 6 HOURS PRN
Status: DISCONTINUED | OUTPATIENT
Start: 2025-05-16 | End: 2025-05-18 | Stop reason: HOSPADM

## 2025-05-16 RX ORDER — TERBUTALINE SULFATE 1 MG/ML
0.25 INJECTION SUBCUTANEOUS
Status: DISCONTINUED | OUTPATIENT
Start: 2025-05-16 | End: 2025-05-18 | Stop reason: HOSPADM

## 2025-05-16 RX ORDER — SEVOFLURANE 250 ML/250ML
1 LIQUID RESPIRATORY (INHALATION) CONTINUOUS PRN
Status: DISCONTINUED | OUTPATIENT
Start: 2025-05-16 | End: 2025-05-16

## 2025-05-16 RX ORDER — NALOXONE HYDROCHLORIDE 0.4 MG/ML
INJECTION, SOLUTION INTRAMUSCULAR; INTRAVENOUS; SUBCUTANEOUS PRN
Status: DISCONTINUED | OUTPATIENT
Start: 2025-05-16 | End: 2025-05-16

## 2025-05-16 RX ORDER — LIDOCAINE HYDROCHLORIDE 10 MG/ML
30 INJECTION, SOLUTION EPIDURAL; INFILTRATION; INTRACAUDAL; PERINEURAL PRN
Status: DISCONTINUED | OUTPATIENT
Start: 2025-05-16 | End: 2025-05-16

## 2025-05-16 RX ORDER — OXYTOCIN 10 [USP'U]/ML
10 INJECTION, SOLUTION INTRAMUSCULAR; INTRAVENOUS ONCE
Status: COMPLETED | OUTPATIENT
Start: 2025-05-16 | End: 2025-05-16

## 2025-05-16 RX ORDER — MISOPROSTOL 200 UG/1
400 TABLET ORAL PRN
Status: DISCONTINUED | OUTPATIENT
Start: 2025-05-16 | End: 2025-05-18 | Stop reason: HOSPADM

## 2025-05-16 RX ORDER — SODIUM CHLORIDE 0.9 % (FLUSH) 0.9 %
5-40 SYRINGE (ML) INJECTION PRN
Status: DISCONTINUED | OUTPATIENT
Start: 2025-05-16 | End: 2025-05-18 | Stop reason: HOSPADM

## 2025-05-16 RX ORDER — DOCUSATE SODIUM 100 MG/1
100 CAPSULE, LIQUID FILLED ORAL 2 TIMES DAILY
Status: DISCONTINUED | OUTPATIENT
Start: 2025-05-16 | End: 2025-05-18 | Stop reason: HOSPADM

## 2025-05-16 RX ORDER — SODIUM CHLORIDE, SODIUM LACTATE, POTASSIUM CHLORIDE, CALCIUM CHLORIDE 600; 310; 30; 20 MG/100ML; MG/100ML; MG/100ML; MG/100ML
INJECTION, SOLUTION INTRAVENOUS CONTINUOUS
Status: DISCONTINUED | OUTPATIENT
Start: 2025-05-16 | End: 2025-05-16

## 2025-05-16 RX ORDER — LIDOCAINE HYDROCHLORIDE AND EPINEPHRINE 20; 5 MG/ML; UG/ML
INJECTION, SOLUTION EPIDURAL; INFILTRATION; INTRACAUDAL; PERINEURAL
Status: DISCONTINUED | OUTPATIENT
Start: 2025-05-16 | End: 2025-05-16 | Stop reason: SDUPTHER

## 2025-05-16 RX ORDER — TRANEXAMIC ACID 10 MG/ML
1000 INJECTION, SOLUTION INTRAVENOUS
Status: DISCONTINUED | OUTPATIENT
Start: 2025-05-16 | End: 2025-05-18 | Stop reason: HOSPADM

## 2025-05-16 RX ORDER — SODIUM CHLORIDE 0.9 % (FLUSH) 0.9 %
5-40 SYRINGE (ML) INJECTION EVERY 12 HOURS SCHEDULED
Status: DISCONTINUED | OUTPATIENT
Start: 2025-05-16 | End: 2025-05-16

## 2025-05-16 RX ORDER — ONDANSETRON 2 MG/ML
4 INJECTION INTRAMUSCULAR; INTRAVENOUS EVERY 6 HOURS PRN
Status: DISCONTINUED | OUTPATIENT
Start: 2025-05-16 | End: 2025-05-16

## 2025-05-16 RX ORDER — SODIUM CHLORIDE 9 MG/ML
INJECTION, SOLUTION INTRAVENOUS PRN
Status: DISCONTINUED | OUTPATIENT
Start: 2025-05-16 | End: 2025-05-16

## 2025-05-16 RX ORDER — IBUPROFEN 800 MG/1
800 TABLET, FILM COATED ORAL EVERY 8 HOURS SCHEDULED
Status: DISCONTINUED | OUTPATIENT
Start: 2025-05-17 | End: 2025-05-16

## 2025-05-16 RX ORDER — IBUPROFEN 800 MG/1
800 TABLET, FILM COATED ORAL EVERY 8 HOURS SCHEDULED
Status: DISCONTINUED | OUTPATIENT
Start: 2025-05-17 | End: 2025-05-18 | Stop reason: HOSPADM

## 2025-05-16 RX ORDER — SODIUM CHLORIDE 0.9 % (FLUSH) 0.9 %
5-40 SYRINGE (ML) INJECTION PRN
Status: DISCONTINUED | OUTPATIENT
Start: 2025-05-16 | End: 2025-05-16

## 2025-05-16 RX ORDER — CARBOPROST TROMETHAMINE 250 UG/ML
250 INJECTION, SOLUTION INTRAMUSCULAR PRN
Status: DISCONTINUED | OUTPATIENT
Start: 2025-05-16 | End: 2025-05-18 | Stop reason: HOSPADM

## 2025-05-16 RX ORDER — FENTANYL/BUPIVACAINE/NS/PF 2-1250MCG
10 PLASTIC BAG, INJECTION (ML) INJECTION CONTINUOUS
Refills: 0 | Status: DISCONTINUED | OUTPATIENT
Start: 2025-05-16 | End: 2025-05-16

## 2025-05-16 RX ORDER — OXYTOCIN 10 [USP'U]/ML
INJECTION, SOLUTION INTRAMUSCULAR; INTRAVENOUS
Status: COMPLETED
Start: 2025-05-16 | End: 2025-05-16

## 2025-05-16 RX ORDER — BUPIVACAINE HYDROCHLORIDE 2.5 MG/ML
INJECTION, SOLUTION EPIDURAL; INFILTRATION; INTRACAUDAL; PERINEURAL
Status: DISCONTINUED | OUTPATIENT
Start: 2025-05-16 | End: 2025-05-16 | Stop reason: SDUPTHER

## 2025-05-16 RX ORDER — SODIUM CHLORIDE, SODIUM LACTATE, POTASSIUM CHLORIDE, AND CALCIUM CHLORIDE .6; .31; .03; .02 G/100ML; G/100ML; G/100ML; G/100ML
500 INJECTION, SOLUTION INTRAVENOUS PRN
Status: DISCONTINUED | OUTPATIENT
Start: 2025-05-16 | End: 2025-05-16

## 2025-05-16 RX ORDER — TRANEXAMIC ACID 10 MG/ML
1000 INJECTION, SOLUTION INTRAVENOUS
Status: DISCONTINUED | OUTPATIENT
Start: 2025-05-16 | End: 2025-05-16

## 2025-05-16 RX ADMIN — OXYTOCIN 10 UNITS: 10 INJECTION, SOLUTION INTRAMUSCULAR; INTRAVENOUS at 09:07

## 2025-05-16 RX ADMIN — Medication 10 ML/HR: at 07:06

## 2025-05-16 RX ADMIN — SODIUM CHLORIDE, SODIUM LACTATE, POTASSIUM CHLORIDE, AND CALCIUM CHLORIDE: .6; .31; .03; .02 INJECTION, SOLUTION INTRAVENOUS at 07:12

## 2025-05-16 RX ADMIN — BUPIVACAINE HYDROCHLORIDE 10 ML: 2.5 INJECTION, SOLUTION EPIDURAL; INFILTRATION; INTRACAUDAL; PERINEURAL at 06:51

## 2025-05-16 RX ADMIN — ACETAMINOPHEN 1000 MG: 500 TABLET ORAL at 21:14

## 2025-05-16 RX ADMIN — IBUPROFEN 800 MG: 800 TABLET, FILM COATED ORAL at 14:30

## 2025-05-16 RX ADMIN — LIDOCAINE HYDROCHLORIDE AND EPINEPHRINE 3 ML: 20; 5 INJECTION, SOLUTION EPIDURAL; INFILTRATION; INTRACAUDAL; PERINEURAL at 06:43

## 2025-05-16 RX ADMIN — SODIUM CHLORIDE, SODIUM LACTATE, POTASSIUM CHLORIDE, AND CALCIUM CHLORIDE: .6; .31; .03; .02 INJECTION, SOLUTION INTRAVENOUS at 05:08

## 2025-05-16 RX ADMIN — Medication 87.3 MILLI-UNITS/MIN: at 09:09

## 2025-05-16 ASSESSMENT — PAIN DESCRIPTION - ORIENTATION: ORIENTATION: MID;LOWER

## 2025-05-16 ASSESSMENT — PAIN SCALES - GENERAL
PAINLEVEL_OUTOF10: 5
PAINLEVEL_OUTOF10: 3

## 2025-05-16 ASSESSMENT — PAIN DESCRIPTION - DESCRIPTORS
DESCRIPTORS: CRAMPING
DESCRIPTORS: CRAMPING

## 2025-05-16 ASSESSMENT — PAIN - FUNCTIONAL ASSESSMENT
PAIN_FUNCTIONAL_ASSESSMENT: ACTIVITIES ARE NOT PREVENTED
PAIN_FUNCTIONAL_ASSESSMENT: ACTIVITIES ARE NOT PREVENTED

## 2025-05-16 ASSESSMENT — PAIN DESCRIPTION - LOCATION
LOCATION: ABDOMEN
LOCATION: ABDOMEN

## 2025-05-16 NOTE — PROGRESS NOTES
0450: Patient arrived to Labor and Delivery with complaints of strong frequent contractions. Patient denies leaking of fluids and  vaginal bleeding. Patient endorses positive fetal movement. Patient placed on monitor.     0500: MD Kathryn at bedside assessing patient and admitting patient to unit. MD discussing POC with patient. Patient denies additional questions att.     0505: Bedside and Verbal shift change report given to [place] (oncoming nurse) by [place] Ellis (offgoing nurse). Report included the following information Nurse Handoff Report, Adult Overview, Surgery Report, Intake/Output, MAR, Recent Results, Quality Measures, and Event Log.

## 2025-05-16 NOTE — ANESTHESIA PROCEDURE NOTES
Epidural Block    Patient location during procedure: OB  Reason for block: labor epidural  Staffing  Resident/CRNA: Ta Young APRN - CRNA  Performed by: Ta Young APRN - CRNA  Authorized by: Bety Estes MD    Epidural  Patient position: sitting  Prep: Betadine  Patient monitoring: continuous pulse ox and frequent blood pressure checks  Approach: midline  Location: L3-4  Injection technique: CAROLYN air and CAROLYN saline  Guidance: paresthesia technique  Provider prep: sterile gloves and mask  Needle  Needle type: Tuohy   Needle gauge: 17 G  Needle length: 3.5 in  Needle insertion depth: 11 cm  Catheter type: multi-orifice  Catheter size: 20 G  Catheter at skin depth: 15 cm  Test dose: negativeCatheter Secured: tegaderm and tape  Assessment  Hemodynamics: stable  Outcomes: uncomplicated and patient tolerated procedure well  Preanesthetic Checklist  Completed: patient identified, IV checked, site marked, risks and benefits discussed, surgical/procedural consents, equipment checked, pre-op evaluation, timeout performed, anesthesia consent given, oxygen available, monitors applied/VS acknowledged, fire risk safety assessment completed and verbalized and blood product R/B/A discussed and consented           Topical Retinoid counseling:  Patient advised to apply a pea-sized amount only at bedtime and wait 30 minutes after washing their face before applying.  If too drying, patient may add a non-comedogenic moisturizer. The patient verbalized understanding of the proper use and possible adverse effects of retinoids.  All of the patient's questions and concerns were addressed.

## 2025-05-16 NOTE — PROGRESS NOTES
0050 - Pt arrived to unit from home with c/o contractions that have been ongoing since her membrane sweep in the office yesterday (5/15/25), but became more intense around 2120. Pt states she was 4 cm in the office and she lives an hour away and \"does not want to deliver at home\". Pt denies leakage/loss of fluid, vaginal bleeding, HA, epigastric pain, or vision changes.    0056 - Dr. Peralta performing SVE (4cm). Dr. Peralta discussing POC with pt; pt agreeable to POC. RN to finish triage, obtain NST, then pt should be able to be discharged.    0108 - SBAR report given to CHRISTA Ellis RN.

## 2025-05-16 NOTE — L&D DELIVERY NOTE
Armen Jenkins [[id number]]      Labor Events     Labor: No   Steroids: None  Cervical Ripening Date/Time:      Antibiotics Received during Labor: No  Rupture Date/Time:      Rupture Type: AROM  Fluid Color: Clear  Fluid Odor: None  Fluid Volume: Moderate  Induction: None  Labor Complications: None       Anesthesia    Method: None       Delivery Details      Delivery Date: 25 Delivery Time: 08:58:00   Delivery Type: Vaginal, Spontaneous               Presentation    Presentation: Vertex       Shoulder Dystocia    Shoulder Dystocia Present?: No       Assisted Delivery Details    Forceps Attempted?: No  Vacuum Extractor Attempted?: No                           Cord    Vessels: 3 Vessels  Complications: None  Delayed Cord Clamping?: Yes  Cord Blood Disposition: Lab  Gases Sent?: No              Placenta    Date/Time: 2025 09:05:00  Removal: Expressed  Appearance: Intact  Disposition: Discarded       Lacerations    Episiotomy: None  Perineal Lacerations: None  Other Lacerations: no non-perineal laceration       Vaginal Counts    Initial Count Personnel: JURGEN GODOY RN  Initial Count Verified By: DR. CHAUDHRY  Intial Sponge Count: Correct Intial Needles Count: Correct Intial Instruments Count: Correct   Final Sponges Count: Correct Final Needles  Count: Correct Final Instruments Count: Correct   Final Count Personnel: DR. CHAUDHRY  Final Count Verified By: SRIDHAR GODOY RN  Accurate Final Count?: Yes       Blood Loss  Mother: Cintia Jenkins #[id number]     Start of Mother's Information      Delivery Blood Loss   Intrapartum & Postpartum: 05/15/25 2058 - 25 09    Delivery Admission: 05/15/25 2058 - 25 0958         Intrapartum & Postpartum Delivery Admission    None                  End of Mother's Information  Mother: Cintia Jenkins #[id number]                Delivery Providers    Delivering clinician: Judy Chaudhry MD     Provider Role

## 2025-05-16 NOTE — H&P
History & Physical    Name: Cintia Jenkins MRN: [id number]  SSN: [id number]    YOB: 2000  Age: 25 y.o.  Sex: female        Subjective:     Estimated Date of Delivery: 25  OB History    Para Term  AB Living   3 1 1  1 1   SAB IAB Ectopic Molar Multiple Live Births       0 1      # Outcome Date GA Lbr Ramakrishna/2nd Weight Sex Type Anes PTL Lv   3 Current            2 Term 24 38w3d  2.92 kg (6 lb 7 oz) M Vag-Spont EPI N KADEN      Complications: Cord around body   1 AB              I am a second provider covering for [facility] today and have assumed care of the patient.  I have introduced myself to the patient and her family.    Ms. Jenkins is a 25 y.o.  seen with pregnancy at 39w0d for contractions. Patient said her membranes were swept in clinic today and around 9pm she started having contractions every 5-6 minutes.  Denies ROM, VB.  Good FM.  Prenatal course complicated by obesity, close interval pregnancy and depression/stress.  Please see prenatal records for details.    No past medical history on file.  Past Surgical History:   Procedure Laterality Date    WISDOM TOOTH EXTRACTION       Social History     Occupational History    Not on file   Tobacco Use    Smoking status: Never    Smokeless tobacco: Never   Vaping Use    Vaping status: Never Used   Substance and Sexual Activity    Alcohol use: Not Currently    Drug use: Never    Sexual activity: Yes     Partners: Male     Family History   Problem Relation Age of Onset    Colon Cancer Father         Passed in 2019       No Known Allergies  Prior to Admission medications    Medication Sig Start Date End Date Taking? Authorizing Provider   sertraline (ZOLOFT) 50 MG tablet Take 1 tablet by mouth daily    Joyce Lee MD   Prenatal Vit-Fe Fumarate-FA (PRENATAL VITAMIN) 27-0.8 MG TABS Take by mouth    Joyce Lee MD          Objective:     Vitals:  Vitals:    25 0111   Weight: (!) 144.7 kg (319

## 2025-05-16 NOTE — PROGRESS NOTES
0108: Patient care acquired from GLENIS Florence.     0130: MD Kathryn reviewing fetal strip. Patient to be discharged per MD.    0140: Patient provided with discharge education and instructions. Opportunity for questions and clarification provided. Patient verbalizing understanding. Patient ambulated off the unit leaving to private vehicle.

## 2025-05-16 NOTE — PROGRESS NOTES
Labor Note    Cintia Jenkins  [id number]  2000   39w0d      S:  Feeling comfortable w pcea     O:    BP (!) 140/72   Pulse 74   Temp 98.4 °F (36.9 °C) (Oral)   LMP 2024 (Exact Date)   SpO2 99%        No data found.    GEN NAD   RESP Nonlabored, symmetric chest rise  SVE 6/90/-2, AROM clear  EXTREM Symmetric lower extremities     FHT cat 1   [place] q3 min     A/P:  25 y.o.  @ 39w0d - labor   - GBS neg / Rhpos  - FWB:  [facility]/[place]  - Labor course now s/p AROM, pit if needed   - Pain control - pcea  - Anticipate .      Davion Cordoba MD  [facility]

## 2025-05-16 NOTE — H&P
History & Physical    Name: Cintia Jenkins MRN: [id number]  SSN: [id number]    YOB: 2000  Age: 25 y.o.  Sex: female        Subjective:     Estimated Date of Delivery: 25  OB History    Para Term  AB Living   3 1 1  1 1   SAB IAB Ectopic Molar Multiple Live Births       0 1      # Outcome Date GA Lbr Ramakrishna/2nd Weight Sex Type Anes PTL Lv   3 Current            2 Term 24 38w3d  2.92 kg (6 lb 7 oz) M Vag-Spont EPI N KADEN      Complications: Cord around body   1 AB              I am a second provider covering for [facility] today and have assumed care of the patient.  I have introduced myself to the patient and her family.    Ms. Jenkins is a 25 y.o.  seen with pregnancy at 39w0d for contractions. Patient was seen here several hours prior for similar and d/c home when her cervical exam was unchanged from clinic.  Since discharge she says the contractions have become more intense and regular, every 3-4 minutes now.  She is requesting an epidural.  Good FM, no LOF or VB.  Prenatal course complicated by obesity, close interval pregnancy (delivered 42 weeks ago) and depression/stress. Please see prenatal records for details.    History reviewed. No pertinent past medical history.  Past Surgical History:   Procedure Laterality Date    WISDOM TOOTH EXTRACTION       Social History     Occupational History    Not on file   Tobacco Use    Smoking status: Never    Smokeless tobacco: Never   Vaping Use    Vaping status: Never Used   Substance and Sexual Activity    Alcohol use: Not Currently    Drug use: Never    Sexual activity: Yes     Partners: Male     Family History   Problem Relation Age of Onset    Colon Cancer Father         Passed in 2019       No Known Allergies  Prior to Admission medications    Medication Sig Start Date End Date Taking? Authorizing Provider   sertraline (ZOLOFT) 50 MG tablet Take 1 tablet by mouth daily   Yes Provider, MD Joyce   Prenatal Vit-Fe

## 2025-05-16 NOTE — ANESTHESIA PRE PROCEDURE
Department of Anesthesiology  Preprocedure Note       Name:  Cintia Jenkins   Age:  25 y.o.  :  2000                                          MRN:  [id number]         Date:  2025      Surgeon: * No surgeons listed *    Procedure: * No procedures listed *    Medications prior to admission:   Prior to Admission medications    Medication Sig Start Date End Date Taking? Authorizing Provider   sertraline (ZOLOFT) 50 MG tablet Take 1 tablet by mouth daily   Yes ProviderJoyce MD   Prenatal Vit-Fe Fumarate-FA (PRENATAL VITAMIN) 27-0.8 MG TABS Take by mouth   Yes Provider, MD Joyce       Current medications:    Current Facility-Administered Medications   Medication Dose Route Frequency Provider Last Rate Last Admin   • lactated ringers infusion   IntraVENous Continuous Vest, Marcee C, DO       • lactated ringers bolus 500 mL  500 mL IntraVENous PRN Vest, Marcee C, DO        Or   • lactated ringers bolus 1,000 mL  1,000 mL IntraVENous PRN Vest, Marcee C, DO       • sodium chloride flush 0.9 % injection 5-40 mL  5-40 mL IntraVENous 2 times per day Vest, Marcee C, DO       • sodium chloride flush 0.9 % injection 5-40 mL  5-40 mL IntraVENous PRN Vest, Marcee C, DO       • 0.9 % sodium chloride infusion   IntraVENous PRN Vest, Marcee C, DO       • methylergonovine (METHERGINE) injection 200 mcg  200 mcg IntraMUSCular PRN Vest, Marcee C, DO       • carboprost (HEMABATE) injection 250 mcg  250 mcg IntraMUSCular PRN Vest, Marcee C, DO       • miSOPROStol (CYTOTEC) tablet 400 mcg  400 mcg Buccal PRN Vest, Marcee C, DO       • tranexamic acid-NaCl IVPB premix 1,000 mg  1,000 mg IntraVENous Once PRN Vest, Marcee C, DO       • oxytocin (PITOCIN) 30 units in 500 mL infusion  87.3 miroslava-units/min IntraVENous Continuous PRN Vest, Marchanane C, DO        And   • oxytocin (PITOCIN) 10 unit bolus from the bag  10 Units IntraVENous PRN Vest, Marcee C, DO       • terbutaline (BRETHINE) injection 0.25 mg  0.25 mg

## 2025-05-17 PROCEDURE — 94761 N-INVAS EAR/PLS OXIMETRY MLT: CPT

## 2025-05-17 PROCEDURE — 6370000000 HC RX 637 (ALT 250 FOR IP): Performed by: OBSTETRICS & GYNECOLOGY

## 2025-05-17 PROCEDURE — 1120000000 HC RM PRIVATE OB

## 2025-05-17 RX ADMIN — IBUPROFEN 800 MG: 800 TABLET, FILM COATED ORAL at 00:34

## 2025-05-17 RX ADMIN — IBUPROFEN 800 MG: 800 TABLET, FILM COATED ORAL at 08:10

## 2025-05-17 RX ADMIN — ACETAMINOPHEN 1000 MG: 500 TABLET ORAL at 13:10

## 2025-05-17 RX ADMIN — ACETAMINOPHEN 1000 MG: 500 TABLET ORAL at 05:21

## 2025-05-17 RX ADMIN — FERROUS SULFATE TAB 325 MG (65 MG ELEMENTAL FE) 325 MG: 325 (65 FE) TAB at 08:10

## 2025-05-17 RX ADMIN — DOCUSATE SODIUM 100 MG: 100 CAPSULE, LIQUID FILLED ORAL at 08:10

## 2025-05-17 ASSESSMENT — PAIN DESCRIPTION - LOCATION
LOCATION: ABDOMEN
LOCATION: ABDOMEN

## 2025-05-17 ASSESSMENT — PAIN SCALES - GENERAL
PAINLEVEL_OUTOF10: 4
PAINLEVEL_OUTOF10: 4
PAINLEVEL_OUTOF10: 0

## 2025-05-17 ASSESSMENT — PAIN - FUNCTIONAL ASSESSMENT
PAIN_FUNCTIONAL_ASSESSMENT: ACTIVITIES ARE NOT PREVENTED

## 2025-05-17 ASSESSMENT — PAIN DESCRIPTION - ORIENTATION
ORIENTATION: MID;LOWER
ORIENTATION: MID;LOWER

## 2025-05-17 ASSESSMENT — PAIN DESCRIPTION - DESCRIPTORS
DESCRIPTORS: CRAMPING
DESCRIPTORS: CRAMPING

## 2025-05-17 NOTE — PROGRESS NOTES
PostPartum Note    Cintia Jenkins  [id number]  2000  25 y.o.    S:  Ms. Cintia Jenkins is a 25 y.o.  PPD #1 s/p  @ 39w0d.  Doing well.  She had a baby girl.  Her lochia is like a period.  She describes her pain as mild and is well controlled with PO medications. She is ambulating and voiding.  Tolerating PO intake.      O:   /72   Pulse 76   Temp 98.6 °F (37 °C) (Oral)   Resp 17   LMP 2024 (Exact Date)   SpO2 100%   Breastfeeding Unknown     Gen - No acute distress  Respirations - nonlabored   Abdomen - Fundus firm below the umbilicus   Ext - Warm, well perfused, nontender     Lab Results   Component Value Date/Time    WBC 10.1 2025 05:24 AM    HGB 11.4 2025 05:24 AM    HCT 34.5 2025 05:24 AM     2025 05:24 AM    MCV 85.6 2025 05:24 AM         A/P:  25 y.o.  PPD #1 s/p  @ 39w0d doing well.    1.  Routine PP instructions/ care discussed  2.  Blood type - Rh pos  3.  Rubella immune   4.  H/o depression: recommend 1 wk mood check  5.  Discharge PPD2  6.  F/U 4-6 weeks for PP check.        Elida Esqueda MD  [facility]

## 2025-05-18 VITALS
OXYGEN SATURATION: 100 % | DIASTOLIC BLOOD PRESSURE: 88 MMHG | TEMPERATURE: 98.2 F | HEART RATE: 78 BPM | RESPIRATION RATE: 17 BRPM | SYSTOLIC BLOOD PRESSURE: 128 MMHG

## 2025-05-18 PROCEDURE — 94761 N-INVAS EAR/PLS OXIMETRY MLT: CPT

## 2025-05-18 PROCEDURE — 6370000000 HC RX 637 (ALT 250 FOR IP): Performed by: OBSTETRICS & GYNECOLOGY

## 2025-05-18 RX ADMIN — ACETAMINOPHEN 1000 MG: 500 TABLET ORAL at 09:51

## 2025-05-18 RX ADMIN — IBUPROFEN 800 MG: 800 TABLET, FILM COATED ORAL at 09:52

## 2025-05-18 ASSESSMENT — PAIN - FUNCTIONAL ASSESSMENT: PAIN_FUNCTIONAL_ASSESSMENT: ACTIVITIES ARE NOT PREVENTED

## 2025-05-18 ASSESSMENT — PAIN DESCRIPTION - DESCRIPTORS: DESCRIPTORS: CRAMPING

## 2025-05-18 ASSESSMENT — PAIN SCALES - GENERAL: PAINLEVEL_OUTOF10: 5

## 2025-05-18 ASSESSMENT — PAIN DESCRIPTION - LOCATION: LOCATION: ABDOMEN

## 2025-05-18 NOTE — PROGRESS NOTES
PostPartum Note    Cintia Jenkins  [id number]  2000  25 y.o.    S:  Ms. Cintia Jenkins is a 25 y.o.  PPD #2 s/p  @ 39w0d.  Doing well.  She had a baby girl.  Her lochia is like a period.  She describes her pain as mild and is well controlled with PO medications. She is ambulating and voiding.  Tolerating PO intake.      O:   /88   Pulse 78   Temp 98.2 °F (36.8 °C) (Oral)   Resp 17   LMP 2024 (Exact Date)   SpO2 100%   Breastfeeding Unknown     Gen - No acute distress  Respirations - nonlabored   Abdomen - Fundus firm below the umbilicus   Ext - Warm, well perfused, nontender     Lab Results   Component Value Date/Time    WBC 10.1 2025 05:24 AM    HGB 11.4 2025 05:24 AM    HCT 34.5 2025 05:24 AM     2025 05:24 AM    MCV 85.6 2025 05:24 AM         A/P:  25 y.o.  PPD #2 s/p  @ 39w0d doing well.    1.  Routine PP instructions/ care discussed  2.  Blood type - Rh pos  3.  Rubella immune   4.  Discharge home today  5.  F/U 4-6 weeks for PP check.        Elida Esqueda MD  [facility]

## 2025-05-18 NOTE — DISCHARGE INSTRUCTIONS
milk of magnesia or rectal remedies such as Dulcolax or Fleet’s enema.      Recovery: What to Expect at Home  Fatigue is expected.  Try to rest when you can and don't worry about doing housework or other tasks which can wait.  The soreness along your bottom will improve significantly over the first 2 weeks, but it may take 6 weeks before you are completely recovered.  Back pain or general body aches or muscle soreness are expected and should improve with acetominophen or ibuprofen.  Leg swelling due to pregnancy and/or IV fluids given in the hospital will take about two weeks to resolve.  Most women experience some form of the \"Baby Blues\" after having a baby.  Feeling emotional, tearful, frustrated, anxious, sad, and irritable some of the time is normal and go away after about 2 weeks.  Adequate rest and help from your family will help.  Take breaks from caring for the baby.  Call your doctor if your symptoms seem severe, last more than 2 weeks, or seem to be getting worse instead of better.  Get help immediately if you have thoughts of wanting to hurt yourself or others!      Call your doctor or seek immediate medical care if you have:  Heavy vaginal bleeding, soaking through one or more pads an hour for several hours.   Foul-smelling discharge from your vagina or incision.  Consistent nausea and vomiting and cannot keep fluids down.  Consistent pain that does not get better after you take pain medicine.  Sudden chest pain and shortness of breath  Signs of a blood clot: pain/ swelling/ increasing redness in your lower extremeties  Signs of infection: increased pain in your abdomen or vaginal area; red streaks, warmth, or tenderness of your breasts; fever of 100.5 F or greater

## 2025-05-18 NOTE — PROGRESS NOTES
Primary RN reviewed discharge instructions with the patient.  The patient verbalized understanding. Patient wheeled out by volunteers.

## 2025-05-18 NOTE — PROGRESS NOTES
Patient off unit in stable condition via wheelchair with volunteers for discharge home per  MD.  Patient is to follow up in 1 week for mood check and again in 6 weeks and is aware.  Patient denies H/A, dizziness, nausea and or vomiting or pain at this time. Infant in car seat with mom.

## 2025-05-18 NOTE — DISCHARGE SUMMARY
Obstetrical Discharge Summary     Name: Cintia Jenkins MRN: [id number]  SSN: [id number]    YOB: 2000  Age: 25 y.o.  Sex: female      Admit Date: 2025    Discharge Date: 2025     Attending Physician:  Davion Cordoba MD     Delivering Physician:  MD Richa     * Admission Diagnoses:   IUP @ 39w0d    * Discharge Diagnoses:   Delivery of a viable infant via  by MD Richa on 2025.    Same as above     Additional Diagnoses:      No results found for: \"RUBELLAEXT\", \"GRBSEXT\"   There is no immunization history for the selected administration types on file for this patient.    * Procedures:        * Discharge Condition: good    * Hospital Course: Normal hospital course following the delivery.    * Disposition: Home    Discharge Medications:      Medication List        CONTINUE taking these medications      Prenatal Vitamin 27-0.8 MG Tabs     sertraline 50 MG tablet  Commonly known as: ZOLOFT              * Follow-up Care/Patient Instructions:  Activity: Activity as tolerated  Diet: Regular Diet  Wound Care: As directed  Followup 4-6 weeks for PP check        Signed By:  Elida Esqueda MD     May 18, 2025

## 2025-05-19 LAB — T PALLIDUM AB SER QL IA: NON REACTIVE
